# Patient Record
Sex: MALE | Race: WHITE | NOT HISPANIC OR LATINO | URBAN - METROPOLITAN AREA
[De-identification: names, ages, dates, MRNs, and addresses within clinical notes are randomized per-mention and may not be internally consistent; named-entity substitution may affect disease eponyms.]

---

## 2017-11-06 ENCOUNTER — EMERGENCY (EMERGENCY)
Facility: HOSPITAL | Age: 52
LOS: 1 days | Discharge: HOME | End: 2017-11-06
Attending: EMERGENCY MEDICINE | Admitting: EMERGENCY MEDICINE
Payer: COMMERCIAL

## 2017-11-06 VITALS
TEMPERATURE: 99 F | DIASTOLIC BLOOD PRESSURE: 83 MMHG | HEART RATE: 85 BPM | OXYGEN SATURATION: 98 % | RESPIRATION RATE: 16 BRPM | WEIGHT: 169.98 LBS | SYSTOLIC BLOOD PRESSURE: 137 MMHG

## 2017-11-06 VITALS
RESPIRATION RATE: 16 BRPM | DIASTOLIC BLOOD PRESSURE: 88 MMHG | HEART RATE: 75 BPM | OXYGEN SATURATION: 100 % | SYSTOLIC BLOOD PRESSURE: 128 MMHG | TEMPERATURE: 99 F

## 2017-11-06 DIAGNOSIS — Z88.0 ALLERGY STATUS TO PENICILLIN: ICD-10-CM

## 2017-11-06 DIAGNOSIS — R50.9 FEVER, UNSPECIFIED: ICD-10-CM

## 2017-11-06 DIAGNOSIS — Z96.619 PRESENCE OF UNSPECIFIED ARTIFICIAL SHOULDER JOINT: Chronic | ICD-10-CM

## 2017-11-06 DIAGNOSIS — R10.9 UNSPECIFIED ABDOMINAL PAIN: ICD-10-CM

## 2017-11-06 DIAGNOSIS — Z79.899 OTHER LONG TERM (CURRENT) DRUG THERAPY: ICD-10-CM

## 2017-11-06 DIAGNOSIS — M53.3 SACROCOCCYGEAL DISORDERS, NOT ELSEWHERE CLASSIFIED: ICD-10-CM

## 2017-11-06 LAB
ALBUMIN SERPL ELPH-MCNC: 4.2 G/DL — SIGNIFICANT CHANGE UP (ref 3.3–5)
ALP SERPL-CCNC: 71 U/L — SIGNIFICANT CHANGE UP (ref 40–120)
ALT FLD-CCNC: 40 U/L — SIGNIFICANT CHANGE UP (ref 10–45)
ANION GAP SERPL CALC-SCNC: 15 MMOL/L — SIGNIFICANT CHANGE UP (ref 5–17)
APPEARANCE UR: CLEAR — SIGNIFICANT CHANGE UP
AST SERPL-CCNC: 28 U/L — SIGNIFICANT CHANGE UP (ref 10–40)
BASOPHILS NFR BLD AUTO: 0.4 % — SIGNIFICANT CHANGE UP (ref 0–2)
BILIRUB SERPL-MCNC: 0.3 MG/DL — SIGNIFICANT CHANGE UP (ref 0.2–1.2)
BILIRUB UR-MCNC: NEGATIVE — SIGNIFICANT CHANGE UP
BUN SERPL-MCNC: 22 MG/DL — SIGNIFICANT CHANGE UP (ref 7–23)
CALCIUM SERPL-MCNC: 9.1 MG/DL — SIGNIFICANT CHANGE UP (ref 8.4–10.5)
CHLORIDE SERPL-SCNC: 99 MMOL/L — SIGNIFICANT CHANGE UP (ref 96–108)
CO2 SERPL-SCNC: 22 MMOL/L — SIGNIFICANT CHANGE UP (ref 22–31)
COLOR SPEC: YELLOW — SIGNIFICANT CHANGE UP
CREAT SERPL-MCNC: 1.25 MG/DL — SIGNIFICANT CHANGE UP (ref 0.5–1.3)
DIFF PNL FLD: NEGATIVE — SIGNIFICANT CHANGE UP
EOSINOPHIL NFR BLD AUTO: 5.7 % — SIGNIFICANT CHANGE UP (ref 0–6)
GLUCOSE SERPL-MCNC: 99 MG/DL — SIGNIFICANT CHANGE UP (ref 70–99)
GLUCOSE UR QL: NEGATIVE — SIGNIFICANT CHANGE UP
HCT VFR BLD CALC: 43.8 % — SIGNIFICANT CHANGE UP (ref 39–50)
HGB BLD-MCNC: 14.7 G/DL — SIGNIFICANT CHANGE UP (ref 13–17)
KETONES UR-MCNC: NEGATIVE — SIGNIFICANT CHANGE UP
LACTATE SERPL-SCNC: 0.9 MMOL/L — SIGNIFICANT CHANGE UP (ref 0.5–2)
LEUKOCYTE ESTERASE UR-ACNC: NEGATIVE — SIGNIFICANT CHANGE UP
LYMPHOCYTES # BLD AUTO: 18.9 % — SIGNIFICANT CHANGE UP (ref 13–44)
MCHC RBC-ENTMCNC: 31.3 PG — SIGNIFICANT CHANGE UP (ref 27–34)
MCHC RBC-ENTMCNC: 33.6 G/DL — SIGNIFICANT CHANGE UP (ref 32–36)
MCV RBC AUTO: 93.2 FL — SIGNIFICANT CHANGE UP (ref 80–100)
MONOCYTES NFR BLD AUTO: 10.8 % — SIGNIFICANT CHANGE UP (ref 2–14)
NEUTROPHILS NFR BLD AUTO: 64.2 % — SIGNIFICANT CHANGE UP (ref 43–77)
NITRITE UR-MCNC: NEGATIVE — SIGNIFICANT CHANGE UP
PH UR: 5.5 — SIGNIFICANT CHANGE UP (ref 5–8)
PLATELET # BLD AUTO: 226 K/UL — SIGNIFICANT CHANGE UP (ref 150–400)
POTASSIUM SERPL-MCNC: 4.4 MMOL/L — SIGNIFICANT CHANGE UP (ref 3.5–5.3)
POTASSIUM SERPL-SCNC: 4.4 MMOL/L — SIGNIFICANT CHANGE UP (ref 3.5–5.3)
PROT SERPL-MCNC: 7 G/DL — SIGNIFICANT CHANGE UP (ref 6–8.3)
PROT UR-MCNC: NEGATIVE MG/DL — SIGNIFICANT CHANGE UP
RBC # BLD: 4.7 M/UL — SIGNIFICANT CHANGE UP (ref 4.2–5.8)
RBC # FLD: 12.5 % — SIGNIFICANT CHANGE UP (ref 10.3–16.9)
SODIUM SERPL-SCNC: 136 MMOL/L — SIGNIFICANT CHANGE UP (ref 135–145)
SP GR SPEC: 1.01 — SIGNIFICANT CHANGE UP (ref 1–1.03)
UROBILINOGEN FLD QL: 0.2 E.U./DL — SIGNIFICANT CHANGE UP
WBC # BLD: 12.6 K/UL — HIGH (ref 3.8–10.5)
WBC # FLD AUTO: 12.6 K/UL — HIGH (ref 3.8–10.5)

## 2017-11-06 PROCEDURE — 99284 EMERGENCY DEPT VISIT MOD MDM: CPT | Mod: 25

## 2017-11-06 PROCEDURE — 74177 CT ABD & PELVIS W/CONTRAST: CPT | Mod: 26

## 2017-11-06 PROCEDURE — 87040 BLOOD CULTURE FOR BACTERIA: CPT

## 2017-11-06 PROCEDURE — 85025 COMPLETE CBC W/AUTO DIFF WBC: CPT

## 2017-11-06 PROCEDURE — 83605 ASSAY OF LACTIC ACID: CPT

## 2017-11-06 PROCEDURE — 99285 EMERGENCY DEPT VISIT HI MDM: CPT

## 2017-11-06 PROCEDURE — 36415 COLL VENOUS BLD VENIPUNCTURE: CPT

## 2017-11-06 PROCEDURE — 74177 CT ABD & PELVIS W/CONTRAST: CPT

## 2017-11-06 PROCEDURE — 87086 URINE CULTURE/COLONY COUNT: CPT

## 2017-11-06 PROCEDURE — 80053 COMPREHEN METABOLIC PANEL: CPT

## 2017-11-06 PROCEDURE — 81003 URINALYSIS AUTO W/O SCOPE: CPT

## 2017-11-06 RX ORDER — SODIUM CHLORIDE 9 MG/ML
1000 INJECTION INTRAMUSCULAR; INTRAVENOUS; SUBCUTANEOUS ONCE
Qty: 0 | Refills: 0 | Status: COMPLETED | OUTPATIENT
Start: 2017-11-06 | End: 2017-11-06

## 2017-11-06 RX ORDER — IOHEXOL 300 MG/ML
50 INJECTION, SOLUTION INTRAVENOUS ONCE
Qty: 0 | Refills: 0 | Status: COMPLETED | OUTPATIENT
Start: 2017-11-06 | End: 2017-11-06

## 2017-11-06 RX ORDER — SODIUM CHLORIDE 9 MG/ML
3 INJECTION INTRAMUSCULAR; INTRAVENOUS; SUBCUTANEOUS ONCE
Qty: 0 | Refills: 0 | Status: COMPLETED | OUTPATIENT
Start: 2017-11-06 | End: 2017-11-06

## 2017-11-06 RX ORDER — ACETAMINOPHEN 500 MG
975 TABLET ORAL ONCE
Qty: 0 | Refills: 0 | Status: COMPLETED | OUTPATIENT
Start: 2017-11-06 | End: 2017-11-06

## 2017-11-06 RX ADMIN — SODIUM CHLORIDE 1000 MILLILITER(S): 9 INJECTION INTRAMUSCULAR; INTRAVENOUS; SUBCUTANEOUS at 21:15

## 2017-11-06 RX ADMIN — IOHEXOL 50 MILLILITER(S): 300 INJECTION, SOLUTION INTRAVENOUS at 21:16

## 2017-11-06 RX ADMIN — Medication 975 MILLIGRAM(S): at 21:24

## 2017-11-06 RX ADMIN — SODIUM CHLORIDE 3 MILLILITER(S): 9 INJECTION INTRAMUSCULAR; INTRAVENOUS; SUBCUTANEOUS at 21:16

## 2017-11-06 NOTE — ED PROVIDER NOTE - OBJECTIVE STATEMENT
51 yo m w/ HIV on HAART c/o abd pain on/off past week, had sharp pain that was so intense he called 911, the pain was never as bad since then, had CT showing possible ileitis in the RLQ, started on cipro/flagyl - has been taking them and initialyl felt much better , all sx resolved and now the sx are coming back again, mainly lower abd. no n/v. + low grade fever today, loose stools, but not diarrhea. pain at the coccyx making it uncomfortable to sit. no rash, no bleeding, no dc. no URI sx.

## 2017-11-06 NOTE — ED PROVIDER NOTE - MEDICAL DECISION MAKING DETAILS
pt w/ abd pain 1 wk ago, had CT abd showing ileitis, taking cipro/flagyl, c/o feeling better and now feeling worse, low grade fever today, pain at the coccyx 9 had before, resolved, no back) unimpressive abd exam, noraml ct and labs ( had WBC of 18 before, tranding in the right direction) will d cwith results and f/u with his PMD. has appt next week

## 2017-11-06 NOTE — ED ADULT TRIAGE NOTE - CHIEF COMPLAINT QUOTE
lower abdominal cramping x 1 week.  Seen at Urgent Care on 10/31/17 and dx w/ Ileitis.  Patient long term through abx tx of Flagyl and Cipro.

## 2017-11-06 NOTE — ED ADULT NURSE NOTE - CHIEF COMPLAINT QUOTE
lower abdominal cramping x 1 week.  Seen at Urgent Care on 10/31/17 and dx w/ Ileitis.  Patient FCI through abx tx of Flagyl and Cipro.

## 2017-11-06 NOTE — ED PROVIDER NOTE - GASTROINTESTINAL, MLM
Abdomen soft, non-tender, no guarding. rectal: pain/tender at the coccyx bone, othewise normal - no skin changes or other findings

## 2017-11-08 LAB
CULTURE RESULTS: NO GROWTH — SIGNIFICANT CHANGE UP
SPECIMEN SOURCE: SIGNIFICANT CHANGE UP

## 2017-11-12 LAB
CULTURE RESULTS: SIGNIFICANT CHANGE UP
CULTURE RESULTS: SIGNIFICANT CHANGE UP
SPECIMEN SOURCE: SIGNIFICANT CHANGE UP
SPECIMEN SOURCE: SIGNIFICANT CHANGE UP

## 2018-05-26 ENCOUNTER — EMERGENCY (EMERGENCY)
Facility: HOSPITAL | Age: 53
LOS: 1 days | Discharge: HOME | End: 2018-05-26
Attending: EMERGENCY MEDICINE | Admitting: EMERGENCY MEDICINE
Payer: COMMERCIAL

## 2018-05-26 VITALS
OXYGEN SATURATION: 97 % | TEMPERATURE: 98 F | RESPIRATION RATE: 18 BRPM | SYSTOLIC BLOOD PRESSURE: 133 MMHG | DIASTOLIC BLOOD PRESSURE: 84 MMHG | HEART RATE: 82 BPM

## 2018-05-26 VITALS
DIASTOLIC BLOOD PRESSURE: 85 MMHG | SYSTOLIC BLOOD PRESSURE: 123 MMHG | HEIGHT: 70 IN | OXYGEN SATURATION: 95 % | HEART RATE: 94 BPM | RESPIRATION RATE: 18 BRPM | WEIGHT: 175.05 LBS | TEMPERATURE: 99 F

## 2018-05-26 DIAGNOSIS — Z79.899 OTHER LONG TERM (CURRENT) DRUG THERAPY: ICD-10-CM

## 2018-05-26 DIAGNOSIS — R50.9 FEVER, UNSPECIFIED: ICD-10-CM

## 2018-05-26 DIAGNOSIS — N41.0 ACUTE PROSTATITIS: ICD-10-CM

## 2018-05-26 DIAGNOSIS — Z96.619 PRESENCE OF UNSPECIFIED ARTIFICIAL SHOULDER JOINT: Chronic | ICD-10-CM

## 2018-05-26 DIAGNOSIS — Z88.0 ALLERGY STATUS TO PENICILLIN: ICD-10-CM

## 2018-05-26 DIAGNOSIS — Z79.2 LONG TERM (CURRENT) USE OF ANTIBIOTICS: ICD-10-CM

## 2018-05-26 DIAGNOSIS — B20 HUMAN IMMUNODEFICIENCY VIRUS [HIV] DISEASE: ICD-10-CM

## 2018-05-26 LAB
APPEARANCE UR: CLEAR — SIGNIFICANT CHANGE UP
BILIRUB UR-MCNC: NEGATIVE — SIGNIFICANT CHANGE UP
COLOR SPEC: YELLOW — SIGNIFICANT CHANGE UP
DIFF PNL FLD: NEGATIVE — SIGNIFICANT CHANGE UP
GLUCOSE UR QL: NEGATIVE — SIGNIFICANT CHANGE UP
KETONES UR-MCNC: NEGATIVE — SIGNIFICANT CHANGE UP
LEUKOCYTE ESTERASE UR-ACNC: NEGATIVE — SIGNIFICANT CHANGE UP
NITRITE UR-MCNC: NEGATIVE — SIGNIFICANT CHANGE UP
PH UR: 5.5 — SIGNIFICANT CHANGE UP (ref 5–8)
PROT UR-MCNC: NEGATIVE MG/DL — SIGNIFICANT CHANGE UP
RAPID RVP RESULT: SIGNIFICANT CHANGE UP
SP GR SPEC: 1.01 — SIGNIFICANT CHANGE UP (ref 1–1.03)
UROBILINOGEN FLD QL: 0.2 E.U./DL — SIGNIFICANT CHANGE UP

## 2018-05-26 PROCEDURE — 96374 THER/PROPH/DIAG INJ IV PUSH: CPT | Mod: XU

## 2018-05-26 PROCEDURE — 80053 COMPREHEN METABOLIC PANEL: CPT

## 2018-05-26 PROCEDURE — 71046 X-RAY EXAM CHEST 2 VIEWS: CPT | Mod: 26

## 2018-05-26 PROCEDURE — 96375 TX/PRO/DX INJ NEW DRUG ADDON: CPT | Mod: XU

## 2018-05-26 PROCEDURE — 85610 PROTHROMBIN TIME: CPT

## 2018-05-26 PROCEDURE — 87591 N.GONORRHOEAE DNA AMP PROB: CPT

## 2018-05-26 PROCEDURE — 87581 M.PNEUMON DNA AMP PROBE: CPT

## 2018-05-26 PROCEDURE — 87798 DETECT AGENT NOS DNA AMP: CPT

## 2018-05-26 PROCEDURE — 99284 EMERGENCY DEPT VISIT MOD MDM: CPT | Mod: 25

## 2018-05-26 PROCEDURE — 96372 THER/PROPH/DIAG INJ SC/IM: CPT | Mod: XU

## 2018-05-26 PROCEDURE — 87491 CHLMYD TRACH DNA AMP PROBE: CPT

## 2018-05-26 PROCEDURE — 93005 ELECTROCARDIOGRAM TRACING: CPT

## 2018-05-26 PROCEDURE — 81003 URINALYSIS AUTO W/O SCOPE: CPT

## 2018-05-26 PROCEDURE — 87040 BLOOD CULTURE FOR BACTERIA: CPT

## 2018-05-26 PROCEDURE — 71046 X-RAY EXAM CHEST 2 VIEWS: CPT

## 2018-05-26 PROCEDURE — 93010 ELECTROCARDIOGRAM REPORT: CPT

## 2018-05-26 PROCEDURE — 85730 THROMBOPLASTIN TIME PARTIAL: CPT

## 2018-05-26 PROCEDURE — 36415 COLL VENOUS BLD VENIPUNCTURE: CPT

## 2018-05-26 PROCEDURE — 74177 CT ABD & PELVIS W/CONTRAST: CPT

## 2018-05-26 PROCEDURE — 85025 COMPLETE CBC W/AUTO DIFF WBC: CPT

## 2018-05-26 PROCEDURE — 87633 RESP VIRUS 12-25 TARGETS: CPT

## 2018-05-26 PROCEDURE — G0103: CPT

## 2018-05-26 PROCEDURE — 87486 CHLMYD PNEUM DNA AMP PROBE: CPT

## 2018-05-26 PROCEDURE — 83605 ASSAY OF LACTIC ACID: CPT

## 2018-05-26 PROCEDURE — 74177 CT ABD & PELVIS W/CONTRAST: CPT | Mod: 26

## 2018-05-26 RX ORDER — KETOROLAC TROMETHAMINE 30 MG/ML
30 SYRINGE (ML) INJECTION ONCE
Qty: 0 | Refills: 0 | Status: DISCONTINUED | OUTPATIENT
Start: 2018-05-26 | End: 2018-05-26

## 2018-05-26 RX ORDER — AZITHROMYCIN 500 MG/1
1000 TABLET, FILM COATED ORAL ONCE
Qty: 0 | Refills: 0 | Status: COMPLETED | OUTPATIENT
Start: 2018-05-26 | End: 2018-05-26

## 2018-05-26 RX ORDER — SODIUM CHLORIDE 9 MG/ML
1000 INJECTION INTRAMUSCULAR; INTRAVENOUS; SUBCUTANEOUS ONCE
Qty: 0 | Refills: 0 | Status: COMPLETED | OUTPATIENT
Start: 2018-05-26 | End: 2018-05-26

## 2018-05-26 RX ORDER — CEFTRIAXONE 500 MG/1
250 INJECTION, POWDER, FOR SOLUTION INTRAMUSCULAR; INTRAVENOUS ONCE
Qty: 0 | Refills: 0 | Status: DISCONTINUED | OUTPATIENT
Start: 2018-05-26 | End: 2018-05-26

## 2018-05-26 RX ORDER — CEFTRIAXONE 500 MG/1
250 INJECTION, POWDER, FOR SOLUTION INTRAMUSCULAR; INTRAVENOUS ONCE
Qty: 0 | Refills: 0 | Status: COMPLETED | OUTPATIENT
Start: 2018-05-26 | End: 2018-05-26

## 2018-05-26 RX ORDER — CIPROFLOXACIN LACTATE 400MG/40ML
1 VIAL (ML) INTRAVENOUS
Qty: 21 | Refills: 0
Start: 2018-05-26 | End: 2018-06-15

## 2018-05-26 RX ORDER — SUMATRIPTAN SUCCINATE 4 MG/.5ML
6 INJECTION, SOLUTION SUBCUTANEOUS ONCE
Qty: 0 | Refills: 0 | Status: COMPLETED | OUTPATIENT
Start: 2018-05-26 | End: 2018-05-26

## 2018-05-26 RX ORDER — METOCLOPRAMIDE HCL 10 MG
10 TABLET ORAL ONCE
Qty: 0 | Refills: 0 | Status: COMPLETED | OUTPATIENT
Start: 2018-05-26 | End: 2018-05-26

## 2018-05-26 RX ADMIN — Medication 104 MILLIGRAM(S): at 16:03

## 2018-05-26 RX ADMIN — SODIUM CHLORIDE 1000 MILLILITER(S): 9 INJECTION INTRAMUSCULAR; INTRAVENOUS; SUBCUTANEOUS at 14:38

## 2018-05-26 RX ADMIN — CEFTRIAXONE 250 MILLIGRAM(S): 500 INJECTION, POWDER, FOR SOLUTION INTRAMUSCULAR; INTRAVENOUS at 21:11

## 2018-05-26 RX ADMIN — Medication 30 MILLIGRAM(S): at 16:03

## 2018-05-26 RX ADMIN — SUMATRIPTAN SUCCINATE 6 MILLIGRAM(S): 4 INJECTION, SOLUTION SUBCUTANEOUS at 16:03

## 2018-05-26 RX ADMIN — AZITHROMYCIN 1000 MILLIGRAM(S): 500 TABLET, FILM COATED ORAL at 20:24

## 2018-05-26 NOTE — ED ADULT NURSE NOTE - OBJECTIVE STATEMENT
hx of HIV, c.o generalized body ache, "feeling out of it", non productive cough since Wednesday. denies any fever, but states he felt warm today. c.o neck stiffness, full range of motion noted. denies any photophobia. denies any chest pain, sob. ekg at bedside. pt states he has trouble "starting to urinate", which started "sometime this week". denies any burning, frequency or blood in urine.

## 2018-05-26 NOTE — ED ADULT TRIAGE NOTE - CHIEF COMPLAINT QUOTE
Patient c/o headache  and disorientation since Wednesday ,also with rashes on the fore head , fever , chills and neck stiffness started this morning . Was sent from urgent care for r/o viral menigitis and shingles . Patient c/o headache  and disorientation since Wednesday ,also with rashes on the fore head , fever , chills and neck stiffness started this morning . Was sent from urgent care for r/o viral meningitis and shingles .

## 2018-05-26 NOTE — ED PROVIDER NOTE - OBJECTIVE STATEMENT
fever  52 yo with 4 days of new symptoms, reports fever for first time today, several days of feeling unwell, achy, funny feeling on skin as though hair standing on head, had sensation of disconnect a few days ago and today starting having dull 2/10 headache and tingling sensation in skin and diaphoresis, reports same duration new difficulty with stop and go urination , no ho of  prostate problems, sexually active and reports neg HIV test 6 months ago, + anal receptive sex, also non productive cough after recent URI a couple week ago.

## 2018-05-26 NOTE — ED PROVIDER NOTE - CONSTITUTIONAL, MLM
normal... non toxic appearing,  well nourished, awake, alert, oriented to person, place, time/situation and appears minimally uncomfortable ,  diaphoretic on forehead, no meningeal signs

## 2018-05-26 NOTE — ED ADULT NURSE NOTE - CHIEF COMPLAINT QUOTE
Patient c/o headache  and disorientation since Wednesday ,also with rashes on the fore head , fever , chills and neck stiffness started this morning . Was sent from urgent care for r/o viral meningitis and shingles .

## 2018-05-26 NOTE — ED PROVIDER NOTE - MEDICAL DECISION MAKING DETAILS
Patient with multiple complaints, sent in by urgent care for concern for shingles meningitis, Patient with minimal headache and no meningeal signs , highly doubt this, considered shingles initially due to burning sensation at scalp however suspect more likely secondary to mild migraine as mostly resolved , no rash at this time.  subtle pna picked up on CT, also high suspician for prostatitis due to prostate tenderenss on exam with new stop and go urination, specific antibiotics discussed with Urology . discussed emergent return instructions

## 2018-05-28 LAB
C TRACH RRNA SPEC QL NAA+PROBE: SIGNIFICANT CHANGE UP
N GONORRHOEA RRNA SPEC QL NAA+PROBE: SIGNIFICANT CHANGE UP
SPECIMEN SOURCE: SIGNIFICANT CHANGE UP

## 2019-02-19 ENCOUNTER — APPOINTMENT (OUTPATIENT)
Dept: OTOLARYNGOLOGY | Facility: CLINIC | Age: 54
End: 2019-02-19
Payer: COMMERCIAL

## 2019-02-19 VITALS
SYSTOLIC BLOOD PRESSURE: 127 MMHG | BODY MASS INDEX: 25.05 KG/M2 | OXYGEN SATURATION: 95 % | DIASTOLIC BLOOD PRESSURE: 81 MMHG | HEART RATE: 85 BPM | WEIGHT: 175 LBS | TEMPERATURE: 98.4 F | HEIGHT: 70 IN

## 2019-02-19 DIAGNOSIS — Z81.8 FAMILY HISTORY OF OTHER MENTAL AND BEHAVIORAL DISORDERS: ICD-10-CM

## 2019-02-19 DIAGNOSIS — J04.0 ACUTE LARYNGITIS: ICD-10-CM

## 2019-02-19 DIAGNOSIS — Z78.9 OTHER SPECIFIED HEALTH STATUS: ICD-10-CM

## 2019-02-19 DIAGNOSIS — J34.9 UNSPECIFIED DISORDER OF NOSE AND NASAL SINUSES: ICD-10-CM

## 2019-02-19 DIAGNOSIS — K21.9 ACUTE LARYNGITIS: ICD-10-CM

## 2019-02-19 DIAGNOSIS — Z87.09 PERSONAL HISTORY OF OTHER DISEASES OF THE RESPIRATORY SYSTEM: ICD-10-CM

## 2019-02-19 DIAGNOSIS — Z82.49 FAMILY HISTORY OF ISCHEMIC HEART DISEASE AND OTHER DISEASES OF THE CIRCULATORY SYSTEM: ICD-10-CM

## 2019-02-19 PROCEDURE — 31575 DIAGNOSTIC LARYNGOSCOPY: CPT

## 2019-02-19 PROCEDURE — 99203 OFFICE O/P NEW LOW 30 MIN: CPT | Mod: 25

## 2019-02-19 RX ORDER — FLUTICASONE PROPIONATE 50 UG/1
50 SPRAY, METERED NASAL
Qty: 16 | Refills: 0 | Status: DISCONTINUED | COMMUNITY
Start: 2019-02-12

## 2019-02-19 RX ORDER — METHYLPREDNISOLONE 4 MG/1
4 TABLET ORAL
Qty: 21 | Refills: 0 | Status: DISCONTINUED | COMMUNITY
Start: 2019-02-12

## 2019-02-19 RX ORDER — ROSUVASTATIN CALCIUM 5 MG/1
5 TABLET, FILM COATED ORAL
Qty: 30 | Refills: 0 | Status: ACTIVE | COMMUNITY
Start: 2017-12-27

## 2019-02-19 RX ORDER — ZOLPIDEM TARTRATE 10 MG/1
10 TABLET ORAL
Qty: 30 | Refills: 0 | Status: DISCONTINUED | COMMUNITY
Start: 2018-08-23

## 2019-02-19 RX ORDER — ONDANSETRON 4 MG/1
4 TABLET, ORALLY DISINTEGRATING ORAL
Qty: 20 | Refills: 0 | Status: DISCONTINUED | COMMUNITY
Start: 2019-01-23

## 2019-02-19 RX ORDER — CEFDINIR 300 MG/1
300 CAPSULE ORAL
Qty: 20 | Refills: 0 | Status: DISCONTINUED | COMMUNITY
Start: 2019-01-08

## 2019-02-19 NOTE — REVIEW OF SYSTEMS
[Patient Intake Form Reviewed] : Patient intake form was reviewed [Nasal Congestion] : nasal congestion [Nose Bleeds] : nose bleeds [Sinus Pain] : sinus pain [Sinus Pressure] : sinus pressure [As Noted in HPI] : as noted in HPI [Hoarseness] : hoarseness [Throat Pain] : throat pain [Throat Dryness] : throat dryness [Negative] : Heme/Lymph

## 2019-02-22 NOTE — PHYSICAL EXAM
[Normal] : tympanic membranes are normal in both ears [de-identified] : deviated nasal septum.  Reflux laryngitis.  Purulent post nasal drip

## 2019-02-22 NOTE — CONSULT LETTER
[Dear  ___] : Dear  [unfilled], [Consult Letter:] : I had the pleasure of evaluating your patient, [unfilled]. [Please see my note below.] : Please see my note below. [Consult Closing:] : Thank you very much for allowing me to participate in the care of this patient.  If you have any questions, please do not hesitate to contact me. [Sincerely,] : Sincerely, [FreeTextEntry3] : Dhaval Saleh MD, FACS\par Professor of Otolaryngology, [facility] [employer] at [facility]\par Director, [facility], [facility]\par , Head & Neck Service Line, [facility]\par

## 2019-02-22 NOTE — HISTORY OF PRESENT ILLNESS
[de-identified] : 53 years old male patient with history of sinus congestion for the past couple of months.   Patient is present today in the office with deviated nasal septum.  Reflux laryngitis.  Purulent post nasal drip

## 2019-02-22 NOTE — PROCEDURE
[Congested] : congested [Deviated to the Lt] : deviated to the left [Image(s) Captured] : image(s) captured and filed [Topical Lidocaine] : topical lidocaine [Flexible Endoscope] : examined with the flexible endoscope [Serial Number: ___] : Serial Number: [unfilled] [Glottis Arytenoid Cartilages Erythema] : bilateral arytenoid ~M erythema [de-identified] : Deviated nasal septum.  Reflux laryngitis.  Purulent post nasal drip

## 2019-02-25 LAB — BACTERIA FLD CULT: ABNORMAL

## 2019-04-02 ENCOUNTER — APPOINTMENT (OUTPATIENT)
Dept: OTOLARYNGOLOGY | Facility: CLINIC | Age: 54
End: 2019-04-02
Payer: COMMERCIAL

## 2019-04-02 VITALS
RESPIRATION RATE: 17 BRPM | HEART RATE: 74 BPM | DIASTOLIC BLOOD PRESSURE: 81 MMHG | OXYGEN SATURATION: 96 % | TEMPERATURE: 98.6 F | SYSTOLIC BLOOD PRESSURE: 131 MMHG

## 2019-04-02 DIAGNOSIS — J30.9 ALLERGIC RHINITIS, UNSPECIFIED: ICD-10-CM

## 2019-04-02 PROCEDURE — 99213 OFFICE O/P EST LOW 20 MIN: CPT

## 2019-04-02 RX ORDER — FLUTICASONE FUROATE 27.5 UG/1
27.5 SPRAY, METERED NASAL DAILY
Qty: 2 | Refills: 6 | Status: ACTIVE | COMMUNITY
Start: 2019-04-02 | End: 1900-01-01

## 2019-04-02 NOTE — HISTORY OF PRESENT ILLNESS
[de-identified] : 53 years old male patient with history of deviated nasal septum.  Reflux laryngitis.  Post nasal drip    Patient is present today in the office with improve  Reflux laryngitis.  Allergic Rhinitis.   Post nasal drip.  Deviated Nasal Septum

## 2019-04-02 NOTE — CONSULT LETTER
[FreeTextEntry3] : Dhaval Saleh MD, FACS\par Professor of Otolaryngology, [facility] [employer] at [facility]\par Director, [facility], [facility]\par , Head [facility] Neck Service Line, [facility]\par

## 2019-04-02 NOTE — PROCEDURE
[Congested] : congested [Deviated to the Lt] : deviated to the left [Image(s) Captured] : image(s) captured and filed [Topical Lidocaine] : topical lidocaine [Flexible Endoscope] : examined with the flexible endoscope [Serial Number: ___] : Serial Number: [unfilled] [Glottis Arytenoid Cartilages Erythema] : bilateral arytenoid ~M erythema [de-identified] :  Improve  Reflux laryngitis.  Allergic Rhinitis.   Post nasal drip.  Deviated Nasal Septum

## 2019-04-02 NOTE — REASON FOR VISIT
[Subsequent Evaluation] : a subsequent evaluation for [FreeTextEntry2] : deviated nasal septum.  Reflux laryngitis.  Post nasal drip

## 2019-04-02 NOTE — PHYSICAL EXAM
[Normal] : no rashes [de-identified] : deviated nasal septum.  Reflux laryngitis.  Purulent post nasal drip

## 2019-04-03 RX ORDER — SULFAMETHOXAZOLE AND TRIMETHOPRIM 800; 160 MG/1; MG/1
800-160 TABLET ORAL TWICE DAILY
Qty: 14 | Refills: 0 | Status: COMPLETED | COMMUNITY
Start: 2019-02-19 | End: 2019-04-03

## 2019-05-30 ENCOUNTER — APPOINTMENT (OUTPATIENT)
Dept: OTOLARYNGOLOGY | Facility: CLINIC | Age: 54
End: 2019-05-30
Payer: COMMERCIAL

## 2019-05-30 VITALS
TEMPERATURE: 98.1 F | SYSTOLIC BLOOD PRESSURE: 155 MMHG | DIASTOLIC BLOOD PRESSURE: 99 MMHG | HEART RATE: 87 BPM | OXYGEN SATURATION: 97 %

## 2019-05-30 PROCEDURE — 99213 OFFICE O/P EST LOW 20 MIN: CPT | Mod: 25

## 2019-05-30 PROCEDURE — 31575 DIAGNOSTIC LARYNGOSCOPY: CPT

## 2019-05-30 NOTE — REASON FOR VISIT
[Subsequent Evaluation] : a subsequent evaluation for [FreeTextEntry2] : GERD improved w diet and omeprazole

## 2019-09-09 RX ORDER — OMEPRAZOLE 40 MG/1
40 CAPSULE, DELAYED RELEASE ORAL
Qty: 1 | Refills: 8 | Status: ACTIVE | COMMUNITY
Start: 2019-02-19 | End: 1900-01-01

## 2019-10-29 ENCOUNTER — INPATIENT (INPATIENT)
Facility: HOSPITAL | Age: 54
LOS: 4 days | Discharge: HOME | DRG: 558 | End: 2019-11-03
Attending: INTERNAL MEDICINE | Admitting: INTERNAL MEDICINE
Payer: COMMERCIAL

## 2019-10-29 VITALS
DIASTOLIC BLOOD PRESSURE: 90 MMHG | HEIGHT: 70 IN | HEART RATE: 98 BPM | OXYGEN SATURATION: 97 % | RESPIRATION RATE: 17 BRPM | TEMPERATURE: 99 F | SYSTOLIC BLOOD PRESSURE: 153 MMHG | WEIGHT: 179.9 LBS

## 2019-10-29 DIAGNOSIS — Z96.619 PRESENCE OF UNSPECIFIED ARTIFICIAL SHOULDER JOINT: Chronic | ICD-10-CM

## 2019-10-29 LAB
ANION GAP SERPL CALC-SCNC: 10 MMOL/L — SIGNIFICANT CHANGE UP (ref 5–17)
APTT BLD: 29.6 SEC — SIGNIFICANT CHANGE UP (ref 27.5–36.3)
BASOPHILS # BLD AUTO: 0.04 K/UL — SIGNIFICANT CHANGE UP (ref 0–0.2)
BASOPHILS NFR BLD AUTO: 0.3 % — SIGNIFICANT CHANGE UP (ref 0–2)
BUN SERPL-MCNC: 23 MG/DL — SIGNIFICANT CHANGE UP (ref 7–23)
CALCIUM SERPL-MCNC: 9 MG/DL — SIGNIFICANT CHANGE UP (ref 8.4–10.5)
CHLORIDE SERPL-SCNC: 102 MMOL/L — SIGNIFICANT CHANGE UP (ref 96–108)
CO2 SERPL-SCNC: 26 MMOL/L — SIGNIFICANT CHANGE UP (ref 22–31)
CREAT SERPL-MCNC: 1.06 MG/DL — SIGNIFICANT CHANGE UP (ref 0.5–1.3)
EOSINOPHIL # BLD AUTO: 0.06 K/UL — SIGNIFICANT CHANGE UP (ref 0–0.5)
EOSINOPHIL NFR BLD AUTO: 0.5 % — SIGNIFICANT CHANGE UP (ref 0–6)
GLUCOSE SERPL-MCNC: 99 MG/DL — SIGNIFICANT CHANGE UP (ref 70–99)
HCT VFR BLD CALC: 42.4 % — SIGNIFICANT CHANGE UP (ref 39–50)
HGB BLD-MCNC: 14 G/DL — SIGNIFICANT CHANGE UP (ref 13–17)
IMM GRANULOCYTES NFR BLD AUTO: 0.4 % — SIGNIFICANT CHANGE UP (ref 0–1.5)
INR BLD: 1.16 — SIGNIFICANT CHANGE UP (ref 0.88–1.16)
LYMPHOCYTES # BLD AUTO: 1.7 K/UL — SIGNIFICANT CHANGE UP (ref 1–3.3)
LYMPHOCYTES # BLD AUTO: 14.6 % — SIGNIFICANT CHANGE UP (ref 13–44)
MCHC RBC-ENTMCNC: 30.5 PG — SIGNIFICANT CHANGE UP (ref 27–34)
MCHC RBC-ENTMCNC: 33 GM/DL — SIGNIFICANT CHANGE UP (ref 32–36)
MCV RBC AUTO: 92.4 FL — SIGNIFICANT CHANGE UP (ref 80–100)
MONOCYTES # BLD AUTO: 1 K/UL — HIGH (ref 0–0.9)
MONOCYTES NFR BLD AUTO: 8.6 % — SIGNIFICANT CHANGE UP (ref 2–14)
NEUTROPHILS # BLD AUTO: 8.78 K/UL — HIGH (ref 1.8–7.4)
NEUTROPHILS NFR BLD AUTO: 75.6 % — SIGNIFICANT CHANGE UP (ref 43–77)
NRBC # BLD: 0 /100 WBCS — SIGNIFICANT CHANGE UP (ref 0–0)
PLATELET # BLD AUTO: 272 K/UL — SIGNIFICANT CHANGE UP (ref 150–400)
POTASSIUM SERPL-MCNC: 4.6 MMOL/L — SIGNIFICANT CHANGE UP (ref 3.5–5.3)
POTASSIUM SERPL-SCNC: 4.6 MMOL/L — SIGNIFICANT CHANGE UP (ref 3.5–5.3)
PROTHROM AB SERPL-ACNC: 13.2 SEC — HIGH (ref 10–12.9)
RBC # BLD: 4.59 M/UL — SIGNIFICANT CHANGE UP (ref 4.2–5.8)
RBC # FLD: 13.4 % — SIGNIFICANT CHANGE UP (ref 10.3–14.5)
SODIUM SERPL-SCNC: 138 MMOL/L — SIGNIFICANT CHANGE UP (ref 135–145)
WBC # BLD: 11.63 K/UL — HIGH (ref 3.8–10.5)
WBC # FLD AUTO: 11.63 K/UL — HIGH (ref 3.8–10.5)

## 2019-10-29 PROCEDURE — 99285 EMERGENCY DEPT VISIT HI MDM: CPT

## 2019-10-29 PROCEDURE — 73562 X-RAY EXAM OF KNEE 3: CPT | Mod: 26,LT

## 2019-10-29 RX ORDER — VANCOMYCIN HCL 1 G
1250 VIAL (EA) INTRAVENOUS EVERY 12 HOURS
Refills: 0 | Status: DISCONTINUED | OUTPATIENT
Start: 2019-10-30 | End: 2019-10-30

## 2019-10-29 RX ORDER — TRAMADOL HYDROCHLORIDE 50 MG/1
50 TABLET ORAL ONCE
Refills: 0 | Status: DISCONTINUED | OUTPATIENT
Start: 2019-10-29 | End: 2019-10-29

## 2019-10-29 RX ORDER — BICTEGRAVIR SODIUM, EMTRICITABINE, AND TENOFOVIR ALAFENAMIDE FUMARATE 30; 120; 15 MG/1; MG/1; MG/1
1 TABLET ORAL EVERY 24 HOURS
Refills: 0 | Status: DISCONTINUED | OUTPATIENT
Start: 2019-10-30 | End: 2019-11-03

## 2019-10-29 RX ORDER — ACETAMINOPHEN 500 MG
650 TABLET ORAL EVERY 6 HOURS
Refills: 0 | Status: DISCONTINUED | OUTPATIENT
Start: 2019-10-29 | End: 2019-11-03

## 2019-10-29 RX ORDER — VANCOMYCIN HCL 1 G
1000 VIAL (EA) INTRAVENOUS ONCE
Refills: 0 | Status: COMPLETED | OUTPATIENT
Start: 2019-10-29 | End: 2019-10-29

## 2019-10-29 RX ADMIN — TRAMADOL HYDROCHLORIDE 50 MILLIGRAM(S): 50 TABLET ORAL at 22:17

## 2019-10-29 RX ADMIN — TRAMADOL HYDROCHLORIDE 50 MILLIGRAM(S): 50 TABLET ORAL at 17:17

## 2019-10-29 RX ADMIN — Medication 250 MILLIGRAM(S): at 19:41

## 2019-10-29 RX ADMIN — TRAMADOL HYDROCHLORIDE 50 MILLIGRAM(S): 50 TABLET ORAL at 23:28

## 2019-10-29 RX ADMIN — TRAMADOL HYDROCHLORIDE 50 MILLIGRAM(S): 50 TABLET ORAL at 18:00

## 2019-10-29 NOTE — ED PROVIDER NOTE - CLINICAL SUMMARY MEDICAL DECISION MAKING FREE TEXT BOX
Pt p/w cellulitis w/ concomitant possible septic arthritis vs abscess. FROM, unlikely septic joint. Check labs, XR, ortho consult. PT will likely need admission for IV abx given failed outpt tx

## 2019-10-29 NOTE — H&P ADULT - HISTORY OF PRESENT ILLNESS
54M PMHx HIV (CD4 900s, VL undetectable, on Biktarvy, no hx opportunistic infections), HLD, HepB (inactive), presented with leg redness swelling that started last Friday. 54M PMHx HIV (CD4 900s, VL undetectable, on Biktarvy, no hx opportunistic infections), HLD, HepB (inactive), presented with leg redness swelling that started last Friday. Patient could not recall any trauma or exposures to RLE, but noted increased redness and swelling over the course of the first 3 days. This prompted the patient to be evaluated at urgent care, where he was started on Bactrim DS BID. Symptoms worsened, and patient returned to urgent care on Monday and was started on clindamycin TID. Patient notably had blood work showing WBC 16 (now downtrended to 11). Otherwise, patient main complaint has been the LLE pain, which as been constant, radiating up to mid thigh and lower ankle, 8/10 intensity, no known alleviating/exacerbating factors. Patient states 1 day history of night sweats, denies fever, chills, nausea, vomiting, diarrhea. Has no difficulty on ambulation.    ED vitals: T 99, HR 98, /90, RR 17, SpO2 97%  ED labs: WBC 11.63, no neutrophilic predominance, ESR 67, PT 13.2, CRP 15.08  ED studies: none  ED course: vancomycin 1g IV, tramadol 50 mg x1. admit to [facility] for LLE cellulitis

## 2019-10-29 NOTE — H&P ADULT - NSHPREVIEWOFSYSTEMS_GEN_ALL_CORE
REVIEW OF SYSTEMS:    CONSTITUTIONAL: +night sweats, Patient denies weakness, fevers or chills  EYES/ENT: Patient denies visual changes;  denies vertigo or throat pain   NECK: Patient denies pain or stiffness  RESPIRATORY: Patient denies cough, wheezing, hemoptysis; denies shortness of breath  CARDIOVASCULAR: Patient denies chest pain or palpitations  GASTROINTESTINAL: Patient denies abdominal or epigastric pain, nausea, vomiting, or hematemesis, diarrhea or constipation, melena or hematochezia.  GENITOURINARY: Patient denies dysuria, frequency or hematuria  MUSCULOSKELETAL: LLE pain  NEUROLOGICAL: Patient denies numbness or weakness  SKIN: Patient denies itching, burning, rashes, or lesions   All other review of systems is negative unless indicated above.

## 2019-10-29 NOTE — H&P ADULT - PROBLEM SELECTOR PLAN 4
History of HLD, vs increased ASCVD risk  - C/w atorvastatin 20 mg QHS (crestor therapeutic exchange)

## 2019-10-29 NOTE — H&P ADULT - NSHPSOCIALHISTORY_GEN_ALL_CORE
Patient works in [employer]. Lives alone. Contracted HIV from unprotected sex many years ago. Denies illicit drug use, denies smoking cigarettes, denies drinking alcohol.

## 2019-10-29 NOTE — ED ADULT NURSE NOTE - OBJECTIVE STATEMENT
54 y.o M a&ox4 walked in from front triage c.o lower leg swelling. went to [facility] MD sat, and Sunday, on bactrim x 3 days. reports worsening L lower leg swelling, tenderness. + 2 non-pitting edema to L leg. + hot and tender to touch. denies fevers, chills. denies recent trauma to site. able to ambualte with steady gait. no numbness, tingling. cap refill < 2 s bilaterally. no PMH.

## 2019-10-29 NOTE — ED PROVIDER NOTE - SECONDARY DIAGNOSIS.
Septic arthritis, due to unspecified organism, septic arthritis of unspecified location Septic bursitis

## 2019-10-29 NOTE — ED PROVIDER NOTE - CARE PLAN
Principal Discharge DX:	Cellulitis, unspecified cellulitis site Principal Discharge DX:	Cellulitis, unspecified cellulitis site  Secondary Diagnosis:	Septic arthritis, due to unspecified organism, septic arthritis of unspecified location Principal Discharge DX:	Cellulitis, unspecified cellulitis site  Secondary Diagnosis:	Septic bursitis

## 2019-10-29 NOTE — CONSULT NOTE ADULT - SUBJECTIVE AND OBJECTIVE BOX
Pt Name: TAYA WHALEN  MRN: [id number]    The patient was seen and examined. 54M with prepatellar septic bursitis of L knee. Patient noticed swelling, redness of L knee on Saturday, presented to  where he was given Bactrim prescription. Worsened on Sunday and patient returned where he was also prescribed Clindamycin. Returns to ED today due to worsening of swelling and redness in LLE. Has been abvle to bear full weight on LLE. Denies any numbness/tingling/fevers/chills. Works at a [employer] office. WBC in  was 16, today it is downtrending to 11.    ROS is otherwise negative.  PAST MEDICAL & SURGICAL HISTORY:  HIV (human immunodeficiency virus infection)  Shoulder joint replacement status      Allergies: NKDA    Medications:  vancomycin  IVPB 1000 milliGRAM(s) IV Intermittent once      Social History:  Ambulation: Walking independently    PHYSICAL EXAM:    T(C): 37.2 (10-29-19 @ 16:43), Max: 37.2 (10-29-19 @ 16:43)  HR: 98 (10-29-19 @ 16:43) (98 - 98)  BP: 153/90 (10-29-19 @ 16:43) (153/90 - 153/90)  RR: 17 (10-29-19 @ 16:43) (17 - 17)  SpO2: 97% (10-29-19 @ 16:43) (97% - 97%)  Wt(kg): --    Gen: well developed, well nourished, comfortable  Affected extremity: LLE  cellultiis extending from knee down to ankle joint diffusely  prepatellar bursa inflamed and tender. effusion noted  full ROM of knee       Motor: 5/5 GS/TA/EHL      Sensation: SILT sp/dp/mayen/saph/t      wwp <2 sec cap refill     Labs:                        14.0   11.63 )-----------( 272      ( 29 Oct 2019 17:31 )             42.4     10-29    138  |  102  |  23  ----------------------------<  99  4.6   |  26  |  1.06    Ca    9.0      29 Oct 2019 17:31    XR shows soft tissue swelling in prepatellar tissue.    A/P  Pt is a 55yo Male s/p L knee prepatellar bursitis  -Compressive ACE wrap on LLE  -WBAT LLE  -ABX per ED, can continue current regimen  -trend inflammatory markers  -f/u with Dr. Sadler in office in 10-14 days  d/w attending on call Dr. Sadler

## 2019-10-29 NOTE — H&P ADULT - PROBLEM SELECTOR PLAN 2
Orthopedic surgery consulted for worsening pain and swelling, and history of MRSA cellulitis in a patient presenting with cellulitis again - concern for septic bursitis/arthritis  - F/u orthopedic surgery recs, no tap for now  - C/w plan as per problem #1  - F/u ESR/CRP

## 2019-10-29 NOTE — H&P ADULT - ASSESSMENT
54M PMHx HIV (CD4 900s, VL undetectable, on Biktarvy, no hx opportunistic infections), HLD, HepB (inactive), presents with LLE swelling, erythema, pain, worsening over 5 days, s/p bactrim and clindamycin tx; admit for cellulitis

## 2019-10-29 NOTE — H&P ADULT - PROBLEM SELECTOR PLAN 1
presents with LLE swelling, erythema, pain, worsening over 5 days, s/p bactrim and clindamycin tx; downtrending WBC from 16 (outpatient) to 11 (intake). S/p vancomycin 1g in the ED. Given history of MRSA cellulitis (2 years ago, LLE just lateral proximal thigh), and HIV status, concern for MRSA cellulitis again  - C/w vancomycin 1250 mg q12h (10/29 - ) obtain trough prior to 4th dose  - F/u blood cultures  - Tylenol for pain control

## 2019-10-29 NOTE — H&P ADULT - NSHPLABSRESULTS_GEN_ALL_CORE
LABS:                         14.0   11.63 )-----------( 272      ( 29 Oct 2019 17:31 )             42.4     10-29    138  |  102  |  23  ----------------------------<  99  4.6   |  26  |  1.06    Ca    9.0      29 Oct 2019 17:31      PT/INR - ( 29 Oct 2019 17:31 )   PT: 13.2 sec;   INR: 1.16          PTT - ( 29 Oct 2019 17:31 )  PTT:29.6 sec      RADIOLOGY, EKG & ADDITIONAL TESTS:  No radiologic studies LABS:                         14.0   11.63 )-----------( 272      ( 29 Oct 2019 17:31 )             42.4     10-29    138  |  102  |  23  ----------------------------<  99  4.6   |  26  |  1.06    Ca    9.0      29 Oct 2019 17:31      PT/INR - ( 29 Oct 2019 17:31 )   PT: 13.2 sec;   INR: 1.16          PTT - ( 29 Oct 2019 17:31 )  PTT:29.6 sec      RADIOLOGY, EKG & ADDITIONAL TESTS:  No radiologic tests

## 2019-10-29 NOTE — H&P ADULT - NSHPPHYSICALEXAM_GEN_ALL_CORE
VITAL SIGNS:  T(C): 37.3 (10-30-19 @ 00:45), Max: 37.3 (10-30-19 @ 00:45)  T(F): 99.1 (10-30-19 @ 00:45), Max: 99.1 (10-30-19 @ 00:45)  HR: 82 (10-30-19 @ 00:45) (75 - 98)  BP: 147/83 (10-30-19 @ 00:45) (132/84 - 153/90)  BP(mean): --  RR: 16 (10-30-19 @ 00:45) (16 - 18)  SpO2: 98% (10-30-19 @ 00:45) (97% - 99%)  Wt(kg): --    PHYSICAL EXAM:    Constitutional: WDWN, lying comfortably in bed, NAD  HEENT: Nc/At, PERRL, EOMI, clear conjunctiva, MMM  Neck: supple; no JVD  Respiratory: CTA b/l, no wheezes, rales, or rhonchi  Cardiac: +S1/S2, +RRR, no murmurs, rubs, or gallops  Gastrointestinal: soft, non-tender, non-distended, normoactive bowel sounds x4  Back: spine midline, no bony tenderness or step-offs; no CVAT B/L  Extremities: WWP, no clubbing or cyanosis, no peripheral edema, LLE large erythema extending from distal thigh to mid shin, with increased warmth, moderate swelling at knee, no crepitus, now dressed with ACE bandage  Vascular: 2+ radial and DP pulses b/l  Neurologic: AAOx3, CNII-XII grossly intact, no focal deficits, 5/5 strength b/l UE and LE  Psychiatric: affect and characteristics of appearance, verbalizations, behaviors are appropriate

## 2019-10-29 NOTE — ED ADULT NURSE NOTE - ADDITIONAL COMPLAINTS
I have personally performed a face to face diagnostic evaluation on this patient. I have reviewed the ACP note and agree with the history, exam and plan of care, except as noted.
Additional Complaints

## 2019-10-29 NOTE — ED PROVIDER NOTE - DIAGNOSTIC INTERPRETATION
Knee XR INTERPRETATION:  no acute fracture; + soft tissue swelling noted; + knee effusion; normal bony alignment.

## 2019-10-29 NOTE — ED PROVIDER NOTE - PROGRESS NOTE DETAILS
Ortho consulted and will see the pt Pt seen by ortho - septic bursitis w/ cellulitis. no tap at this time, as it may worsen infection. admit to medicine, they will follow Spoke to LEANDRO who wants pt admitted to her service

## 2019-10-29 NOTE — H&P ADULT - PROBLEM SELECTOR PLAN 7
1) PCP Contacted on Admission: (Y/N) --> Name & Phone #: Dr. Barrera, admitting physician, yes  2) Date of Contact with PCP: 10/29/2019  3) PCP Contacted at Discharge: (Y/N, N/A)  4) Summary of Handoff Given to PCP:   5) Post-Discharge Appointment Date and Location:

## 2019-10-29 NOTE — ED ADULT NURSE NOTE - CHPI ED NUR SYMPTOMS NEG
no nausea/no weakness/no fever/no decreased eating/drinking/no chills/no vomiting/no tingling/no dizziness

## 2019-10-29 NOTE — ED PROVIDER NOTE - OBJECTIVE STATEMENT
Pt w/ PMHx HIV (CD4 900s, VL undetecable, on HAART, no hx opportunistic infections), HLD, HBV p/w leg redness / swelling. Pt reports sx began 5 days ago w/ an area of redness over the patella. The redness and swelling progressed, and 3 days ago, pt went to , and was started on Bactrim DS BID. Sx continued to progress, pt went back to UC yesterday, and Clindamycin TID was added. Pt also had XR (unknown results) and blood work showing WBC 16. Pt states it is not getting better, and now his foot is swollen, prompting the ED visit. No known fever, but pt reports he has been feeling hot. Pt states he is able to ambulate w/o difficulty and has good ROM of the knee. No hx IVDA or septic joint.

## 2019-10-29 NOTE — ED ADULT TRIAGE NOTE - OTHER COMPLAINTS
pt c.o swelling/pain to L knee that started sat[facility] given [facility]. pt presents with worsening swelling down L leg to foot with increased pain. denies injury/fall.

## 2019-10-29 NOTE — ED PROVIDER NOTE - NS ED ROS FT
Constitutional: See HPI  Eyes: No pain, blurry vision, or discharge.  ENMT: No hearing changes, pain, discharge or infections. No neck pain or stiffness.  Cardiac: No chest pain, SOB or edema. No chest pain with exertion.  Respiratory: No cough or respiratory distress. No hemoptysis. No history of asthma or RAD.  GI: No nausea, vomiting, diarrhea or abdominal pain.  : No dysuria, frequency or burning.  MS: No myalgia, muscle weakness, joint pain or back pain.  Neuro: No headache or weakness. No LOC.  Skin: See HPI  Endocrine: No history of thyroid disease or diabetes.  Except as documented in the HPI, all other systems are negative.

## 2019-10-29 NOTE — ED PROVIDER NOTE - PHYSICAL EXAMINATION
Constitutional: Well appearing, well nourished, awake, alert, oriented to person, place, time/situation and in no apparent distress.  ENMT: Airway patent. Normal MM  Eyes: Clear bilaterally  Musculoskeletal: Range of motion is not limited. FROM L knee. + anterior knee swelling w/ overlying erythema and swelling that extends to the lower leg anteriorly w/ associated foot swelling w/o erythema. no crepitus. no vesicles or bullae. no calf ttp. + anterior knee swelling ttp w/ some fluctuance. 2+ pedal pulses b/l. motor / sensation intact  Neuro: Alert and oriented x 3, face symmetric and speech fluent. Strength 5/5 x 4 ext and symmetric, nml gross motor movement, nml gait. No focal deficits noted.  Skin: Skin normal color for race, warm, dry and intact. See above in MSK  Psych: Alert and oriented to person, place, time/situation. normal mood and affect. no apparent risk to self or others.

## 2019-10-29 NOTE — H&P ADULT - PROBLEM SELECTOR PLAN 3
History of HIV, transmitted from unprotected sex several years ago, no opportunistic infections, last CD4 900s, VLUD  - C/w Biktarvy

## 2019-10-30 DIAGNOSIS — Z91.89 OTHER SPECIFIED PERSONAL RISK FACTORS, NOT ELSEWHERE CLASSIFIED: ICD-10-CM

## 2019-10-30 DIAGNOSIS — R63.8 OTHER SYMPTOMS AND SIGNS CONCERNING FOOD AND FLUID INTAKE: ICD-10-CM

## 2019-10-30 DIAGNOSIS — Z29.9 ENCOUNTER FOR PROPHYLACTIC MEASURES, UNSPECIFIED: ICD-10-CM

## 2019-10-30 DIAGNOSIS — E78.5 HYPERLIPIDEMIA, UNSPECIFIED: ICD-10-CM

## 2019-10-30 DIAGNOSIS — M71.10 OTHER INFECTIVE BURSITIS, UNSPECIFIED SITE: ICD-10-CM

## 2019-10-30 DIAGNOSIS — L03.116 CELLULITIS OF LEFT LOWER LIMB: ICD-10-CM

## 2019-10-30 DIAGNOSIS — B20 HUMAN IMMUNODEFICIENCY VIRUS [HIV] DISEASE: ICD-10-CM

## 2019-10-30 LAB
ANION GAP SERPL CALC-SCNC: 9 MMOL/L — SIGNIFICANT CHANGE UP (ref 5–17)
BUN SERPL-MCNC: 19 MG/DL — SIGNIFICANT CHANGE UP (ref 7–23)
CALCIUM SERPL-MCNC: 8.7 MG/DL — SIGNIFICANT CHANGE UP (ref 8.4–10.5)
CHLORIDE SERPL-SCNC: 101 MMOL/L — SIGNIFICANT CHANGE UP (ref 96–108)
CO2 SERPL-SCNC: 25 MMOL/L — SIGNIFICANT CHANGE UP (ref 22–31)
CREAT SERPL-MCNC: 1.1 MG/DL — SIGNIFICANT CHANGE UP (ref 0.5–1.3)
GLUCOSE SERPL-MCNC: 124 MG/DL — HIGH (ref 70–99)
HCT VFR BLD CALC: 42.4 % — SIGNIFICANT CHANGE UP (ref 39–50)
HGB BLD-MCNC: 13.9 G/DL — SIGNIFICANT CHANGE UP (ref 13–17)
MAGNESIUM SERPL-MCNC: 2.1 MG/DL — SIGNIFICANT CHANGE UP (ref 1.6–2.6)
MCHC RBC-ENTMCNC: 30.1 PG — SIGNIFICANT CHANGE UP (ref 27–34)
MCHC RBC-ENTMCNC: 32.8 GM/DL — SIGNIFICANT CHANGE UP (ref 32–36)
MCV RBC AUTO: 91.8 FL — SIGNIFICANT CHANGE UP (ref 80–100)
NRBC # BLD: 0 /100 WBCS — SIGNIFICANT CHANGE UP (ref 0–0)
PLATELET # BLD AUTO: 284 K/UL — SIGNIFICANT CHANGE UP (ref 150–400)
POTASSIUM SERPL-MCNC: 4.4 MMOL/L — SIGNIFICANT CHANGE UP (ref 3.5–5.3)
POTASSIUM SERPL-SCNC: 4.4 MMOL/L — SIGNIFICANT CHANGE UP (ref 3.5–5.3)
RBC # BLD: 4.62 M/UL — SIGNIFICANT CHANGE UP (ref 4.2–5.8)
RBC # FLD: 14 % — SIGNIFICANT CHANGE UP (ref 10.3–14.5)
SODIUM SERPL-SCNC: 135 MMOL/L — SIGNIFICANT CHANGE UP (ref 135–145)
WBC # BLD: 9.56 K/UL — SIGNIFICANT CHANGE UP (ref 3.8–10.5)
WBC # FLD AUTO: 9.56 K/UL — SIGNIFICANT CHANGE UP (ref 3.8–10.5)

## 2019-10-30 RX ORDER — EMTRICITABINE AND TENOFOVIR DISOPROXIL FUMARATE 200; 300 MG/1; MG/1
1 TABLET, FILM COATED ORAL
Qty: 0 | Refills: 0 | DISCHARGE

## 2019-10-30 RX ORDER — KETOROLAC TROMETHAMINE 30 MG/ML
15 SYRINGE (ML) INJECTION ONCE
Refills: 0 | Status: DISCONTINUED | OUTPATIENT
Start: 2019-10-30 | End: 2019-10-30

## 2019-10-30 RX ORDER — ATORVASTATIN CALCIUM 80 MG/1
20 TABLET, FILM COATED ORAL AT BEDTIME
Refills: 0 | Status: DISCONTINUED | OUTPATIENT
Start: 2019-10-30 | End: 2019-11-03

## 2019-10-30 RX ORDER — DOLUTEGRAVIR SODIUM 25 MG/1
1 TABLET, FILM COATED ORAL
Qty: 0 | Refills: 0 | DISCHARGE

## 2019-10-30 RX ADMIN — Medication 650 MILLIGRAM(S): at 12:14

## 2019-10-30 RX ADMIN — ATORVASTATIN CALCIUM 20 MILLIGRAM(S): 80 TABLET, FILM COATED ORAL at 21:50

## 2019-10-30 RX ADMIN — Medication 166.67 MILLIGRAM(S): at 18:07

## 2019-10-30 RX ADMIN — Medication 166.67 MILLIGRAM(S): at 06:04

## 2019-10-30 RX ADMIN — Medication 650 MILLIGRAM(S): at 06:06

## 2019-10-30 RX ADMIN — BICTEGRAVIR SODIUM, EMTRICITABINE, AND TENOFOVIR ALAFENAMIDE FUMARATE 1 TABLET(S): 30; 120; 15 TABLET ORAL at 06:00

## 2019-10-30 RX ADMIN — Medication 650 MILLIGRAM(S): at 06:59

## 2019-10-30 RX ADMIN — Medication 650 MILLIGRAM(S): at 21:50

## 2019-10-30 RX ADMIN — Medication 15 MILLIGRAM(S): at 16:56

## 2019-10-30 RX ADMIN — Medication 650 MILLIGRAM(S): at 22:50

## 2019-10-30 RX ADMIN — Medication 15 MILLIGRAM(S): at 17:50

## 2019-10-30 RX ADMIN — Medication 650 MILLIGRAM(S): at 13:15

## 2019-10-30 NOTE — PROGRESS NOTE ADULT - PROBLEM SELECTOR PLAN 1
LLE swelling, erythema, pain, worsening over 5 days, s/p bactrim and clindamycin tx; pt w/ hx of MRSA cellulitis 10 years ago (left lateral proximal thigh) s/p I&D. Given history of MRSA cellulitis and HIV status, concern for MRSA cellulitis  -downtrending WBC from 16 (outpatient) to 9.6 today  -S/p vancomycin 1g in the ED   -C/w vancomycin 1250 mg q12h (started on 10/29)  -blood cx NGTD  -Tylenol for pain control LLE swelling, erythema, pain, worsening over 5 days, s/p bactrim and clindamycin tx; pt w/ hx of MRSA cellulitis 10 years ago (left lateral proximal thigh) s/p I&D. Given history of MRSA cellulitis and HIV status, concern for MRSA cellulitis  -downtrending WBC from 16 (outpatient) to 9.6 today  -S/p vancomycin 1g in the ED   -C/w vancomycin 1250 mg q12h (started on 10/29)  -blood cx NGTD  -Tylenol for pain control  - Check Vancomycin trough

## 2019-10-30 NOTE — PROGRESS NOTE ADULT - SUBJECTIVE AND OBJECTIVE BOX
SUBJECTIVE / INTERVAL HPI: Patient seen and examined at bedside. Reports pain and burning sensation in the LLE. Denies Pain in the right leg or other extremities or other joint pain. Denies dizziness, headache, CP, abdominal pain, N/V/F/C.     VITAL SIGNS:  Vital Signs Last 24 Hrs  T(C): 36.8 (30 Oct 2019 08:48), Max: 37.3 (30 Oct 2019 00:45)  T(F): 98.2 (30 Oct 2019 08:48), Max: 99.1 (30 Oct 2019 00:45)  HR: 71 (30 Oct 2019 08:48) (71 - 98)  BP: 133/81 (30 Oct 2019 08:48) (132/84 - 153/90)  BP(mean): --  RR: 15 (30 Oct 2019 08:48) (15 - 18)  SpO2: 97% (30 Oct 2019 08:48) (96% - 99%)    10-29-19 @ 07:01  -  10-30-19 @ 07:00  --------------------------------------------------------  IN: 250 mL / OUT: 800 mL / NET: -550 mL    10-30-19 @ 07:01  -  10-30-19 @ 14:00  --------------------------------------------------------  IN: 360 mL / OUT: 1100 mL / NET: -740 mL        PHYSICAL EXAM:    General: WDWN  HEENT: NC/AT; PERRL, anicteric sclera; MMM  Neck: supple  Cardiovascular: +S1/S2; RRR  Respiratory: CTA B/L; no W/R/R  Gastrointestinal: soft, NT/ND; +BSx4  Extremities: WWP, LLE large erythema extending from distal thigh to mid shin, with increased warmth, moderate swelling at knee, no crepitus, now dressed with ACE bandage  Vascular: 2+ radial, DP/PT pulses B/L  Neurological: AAOx3; no focal deficits    MEDICATIONS:  MEDICATIONS  (STANDING):  atorvastatin 20 milliGRAM(s) Oral at bedtime  bictegravir 50 mG/emtricitabine 200 mG/tenofovir alafenamide 25 mG (BIKTARVY) 1 Tablet(s) Oral every 24 hours  vancomycin  IVPB 1250 milliGRAM(s) IV Intermittent every 12 hours    MEDICATIONS  (PRN):  acetaminophen   Tablet .. 650 milliGRAM(s) Oral every 6 hours PRN Moderate Pain (4 - 6)    ALLERGIES:  Allergies    penicillin (Rash)    Intolerances    LABS:                        13.9   9.56  )-----------( 284      ( 30 Oct 2019 07:16 )             42.4     10-30    135  |  101  |  19  ----------------------------<  124<H>  4.4   |  25  |  1.10    Ca    8.7      30 Oct 2019 07:16  Mg     2.1     10-30      PT/INR - ( 29 Oct 2019 17:31 )   PT: 13.2 sec;   INR: 1.16          PTT - ( 29 Oct 2019 17:31 )  PTT:29.6 sec    CAPILLARY BLOOD GLUCOSE      Culture - Blood (collected 10-29-19 @ 20:51)  Source: .Blood Blood-Peripheral  Preliminary Report (10-30-19 @ 09:01):    No growth at 12 hours    Culture - Blood (collected 10-29-19 @ 20:51)  Source: .Blood Blood-Peripheral  Preliminary Report (10-30-19 @ 09:01):    No growth at 12 hours    RADIOLOGY & ADDITIONAL TESTS: Reviewed. SUBJECTIVE / INTERVAL HPI: Patient seen and examined at bedside. Reports pain and burning sensation in the LLE. Denies Pain in the right leg or other extremities or other joint pain. Denies dizziness, headache, CP, abdominal pain, N/V/F/C.     VITAL SIGNS:  Vital Signs Last 24 Hrs  T(C): 36.8 (30 Oct 2019 08:48), Max: 37.3 (30 Oct 2019 00:45)  T(F): 98.2 (30 Oct 2019 08:48), Max: 99.1 (30 Oct 2019 00:45)  HR: 71 (30 Oct 2019 08:48) (71 - 98)  BP: 133/81 (30 Oct 2019 08:48) (132/84 - 153/90)  BP(mean): --  RR: 15 (30 Oct 2019 08:48) (15 - 18)  SpO2: 97% (30 Oct 2019 08:48) (96% - 99%)    10-29-19 @ 07:01  -  10-30-19 @ 07:00  --------------------------------------------------------  IN: 250 mL / OUT: 800 mL / NET: -550 mL    10-30-19 @ 07:01  -  10-30-19 @ 14:00  --------------------------------------------------------  IN: 360 mL / OUT: 1100 mL / NET: -740 mL        PHYSICAL EXAM:    General: WDWN  HEENT: NC/AT; PERRL, anicteric sclera; MMM  Neck: supple  Cardiovascular: +S1/S2; RRR  Respiratory: CTA B/L; no W/R/R  Gastrointestinal: soft, NT/ND; +BSx4  Extremities: WWP, LLE large erythema extending from distal thigh to mid shin, with increased warmth, moderate swelling at knee, no crepitus, now dressed with ACE bandage and decreased from last night marked line  Vascular: 2+ radial, DP/PT pulses B/L  Neurological: AAOx3; no focal deficits    MEDICATIONS:  MEDICATIONS  (STANDING):  atorvastatin 20 milliGRAM(s) Oral at bedtime  bictegravir 50 mG/emtricitabine 200 mG/tenofovir alafenamide 25 mG (BIKTARVY) 1 Tablet(s) Oral every 24 hours  vancomycin  IVPB 1250 milliGRAM(s) IV Intermittent every 12 hours    MEDICATIONS  (PRN):  acetaminophen   Tablet .. 650 milliGRAM(s) Oral every 6 hours PRN Moderate Pain (4 - 6)    ALLERGIES:  Allergies    penicillin (Rash)    Intolerances    LABS:                        13.9   9.56  )-----------( 284      ( 30 Oct 2019 07:16 )             42.4     10-30    135  |  101  |  19  ----------------------------<  124<H>  4.4   |  25  |  1.10    Ca    8.7      30 Oct 2019 07:16  Mg     2.1     10-30      PT/INR - ( 29 Oct 2019 17:31 )   PT: 13.2 sec;   INR: 1.16          PTT - ( 29 Oct 2019 17:31 )  PTT:29.6 sec    CAPILLARY BLOOD GLUCOSE      Culture - Blood (collected 10-29-19 @ 20:51)  Source: .Blood Blood-Peripheral  Preliminary Report (10-30-19 @ 09:01):    No growth at 12 hours    Culture - Blood (collected 10-29-19 @ 20:51)  Source: .Blood Blood-Peripheral  Preliminary Report (10-30-19 @ 09:01):    No growth at 12 hours    RADIOLOGY & ADDITIONAL TESTS: Reviewed.

## 2019-10-30 NOTE — PROGRESS NOTE ADULT - PROBLEM SELECTOR PLAN 7
1) PCP Contacted on Admission: (Y/N) --> Name & Phone #: Dr. Barrera, admitting physician, yes  2) Date of Contact with PCP: 10/29/2019  3) PCP Contacted at Discharge: (Y/N, N/A)  4) Summary of Handoff Given to PCP:   5) Post-Discharge Appointment Date and Location: 1) PCP Contacted on Admission: (Y) --> Name & Phone #: Dr. Barrera, admitting physician, yes  2) Date of Contact with PCP: 10/29/2019  3) PCP Contacted at Discharge: (N/A)  4) Summary of Handoff Given to PCP:   5) Post-Discharge Appointment Date and Location:

## 2019-10-30 NOTE — PROGRESS NOTE ADULT - ASSESSMENT
55yo M with PMHx of HIV (CD4 900s, VL undetectable, on Biktarvy, no hx of opportunistic infections), HLD, Hep B (inactive), presents with LLE swelling, erythema, pain, worsening over 5 days, s/p bactrim and clindamycin tx. Admitted for cellulitis 53yo M with PMHx of HIV (CD4 900s, VL undetectable, on Biktarvy, no hx of opportunistic infections,  HLD, Hep B and Hepatitis C erology (+) but negative viral load, presents with L leg prepatelar swelling, erythema, pain, worsening over 5 days, failed oral outpatient  bactrim and clindamycin , admitted for  further evaluation and IV antibiotics

## 2019-10-30 NOTE — PROGRESS NOTE ADULT - PROBLEM SELECTOR PLAN 2
Orthopedic surgery consulted for concern for septic bursitis/arthritis  -XR of left knee shows soft tissue swelling in prepatellar tissue and joint with full ROM   -prepatellar bursitis, no tap for now since at risk for introduction of bacteria in the setting of cellulitis, per ortho recs  -c/w Compressive ACE wrap on LLE  -pt to f/u with Dr. Sadler in office in 10-14 days after discharge  -F/u ESR/CRP Orthopedic surgery consulted for concern for septic bursitis/arthritis  -XR of left knee shows soft tissue swelling in prepatellar tissue and joint with full ROM   -prepatellar bursitis, no tap for now since at risk for introduction of bacteria in the setting of cellulitis, per ortho recs  -c/w Compressive ACE wrap on LLE  -pt to f/u with Dr. Sadler in office in 10-14 days after discharge  -F/u ESR/CRP  -LLE MRI with and without contrast

## 2019-10-31 LAB
ANION GAP SERPL CALC-SCNC: 12 MMOL/L — SIGNIFICANT CHANGE UP (ref 5–17)
BUN SERPL-MCNC: 20 MG/DL — SIGNIFICANT CHANGE UP (ref 7–23)
CALCIUM SERPL-MCNC: 9.4 MG/DL — SIGNIFICANT CHANGE UP (ref 8.4–10.5)
CHLORIDE SERPL-SCNC: 103 MMOL/L — SIGNIFICANT CHANGE UP (ref 96–108)
CO2 SERPL-SCNC: 24 MMOL/L — SIGNIFICANT CHANGE UP (ref 22–31)
CREAT SERPL-MCNC: 1.16 MG/DL — SIGNIFICANT CHANGE UP (ref 0.5–1.3)
GLUCOSE SERPL-MCNC: 107 MG/DL — HIGH (ref 70–99)
HCT VFR BLD CALC: 49.9 % — SIGNIFICANT CHANGE UP (ref 39–50)
HGB BLD-MCNC: 16.1 G/DL — SIGNIFICANT CHANGE UP (ref 13–17)
MAGNESIUM SERPL-MCNC: 2.2 MG/DL — SIGNIFICANT CHANGE UP (ref 1.6–2.6)
MCHC RBC-ENTMCNC: 30.4 PG — SIGNIFICANT CHANGE UP (ref 27–34)
MCHC RBC-ENTMCNC: 32.3 GM/DL — SIGNIFICANT CHANGE UP (ref 32–36)
MCV RBC AUTO: 94.3 FL — SIGNIFICANT CHANGE UP (ref 80–100)
MRSA PCR RESULT.: NEGATIVE — SIGNIFICANT CHANGE UP
NRBC # BLD: 0 /100 WBCS — SIGNIFICANT CHANGE UP (ref 0–0)
PLATELET # BLD AUTO: 362 K/UL — SIGNIFICANT CHANGE UP (ref 150–400)
POTASSIUM SERPL-MCNC: 4.9 MMOL/L — SIGNIFICANT CHANGE UP (ref 3.5–5.3)
POTASSIUM SERPL-SCNC: 4.9 MMOL/L — SIGNIFICANT CHANGE UP (ref 3.5–5.3)
RBC # BLD: 5.29 M/UL — SIGNIFICANT CHANGE UP (ref 4.2–5.8)
RBC # FLD: 13.8 % — SIGNIFICANT CHANGE UP (ref 10.3–14.5)
S AUREUS DNA NOSE QL NAA+PROBE: POSITIVE
SODIUM SERPL-SCNC: 139 MMOL/L — SIGNIFICANT CHANGE UP (ref 135–145)
VANCOMYCIN TROUGH SERPL-MCNC: 8.7 UG/ML — LOW (ref 10–20)
WBC # BLD: 10.4 K/UL — SIGNIFICANT CHANGE UP (ref 3.8–10.5)
WBC # FLD AUTO: 10.4 K/UL — SIGNIFICANT CHANGE UP (ref 3.8–10.5)

## 2019-10-31 RX ORDER — VANCOMYCIN HCL 1 G
1500 VIAL (EA) INTRAVENOUS EVERY 12 HOURS
Refills: 0 | Status: DISCONTINUED | OUTPATIENT
Start: 2019-10-31 | End: 2019-11-02

## 2019-10-31 RX ORDER — PANTOPRAZOLE SODIUM 20 MG/1
40 TABLET, DELAYED RELEASE ORAL
Refills: 0 | Status: DISCONTINUED | OUTPATIENT
Start: 2019-10-31 | End: 2019-11-03

## 2019-10-31 RX ADMIN — PANTOPRAZOLE SODIUM 40 MILLIGRAM(S): 20 TABLET, DELAYED RELEASE ORAL at 15:25

## 2019-10-31 RX ADMIN — Medication 300 MILLIGRAM(S): at 21:25

## 2019-10-31 RX ADMIN — Medication 300 MILLIGRAM(S): at 10:46

## 2019-10-31 RX ADMIN — Medication 650 MILLIGRAM(S): at 22:25

## 2019-10-31 RX ADMIN — Medication 650 MILLIGRAM(S): at 21:25

## 2019-10-31 RX ADMIN — ATORVASTATIN CALCIUM 20 MILLIGRAM(S): 80 TABLET, FILM COATED ORAL at 21:23

## 2019-10-31 RX ADMIN — BICTEGRAVIR SODIUM, EMTRICITABINE, AND TENOFOVIR ALAFENAMIDE FUMARATE 1 TABLET(S): 30; 120; 15 TABLET ORAL at 06:43

## 2019-10-31 NOTE — PROGRESS NOTE ADULT - SUBJECTIVE AND OBJECTIVE BOX
OVERNIGHT EVENTS: VINI    SUBJECTIVE / INTERVAL HPI: Patient seen and examined at bedside. Reports pain and burning sensation in the LLE. Denies Pain in the right leg or other extremities or other joint pain. Denies dizziness, headache, CP, abdominal pain, N/V/F/C.     VITAL SIGNS:  Vital Signs Last 24 Hrs  T(C): 37.7 (31 Oct 2019 13:11), Max: 37.7 (31 Oct 2019 13:11)  T(F): 99.9 (31 Oct 2019 13:11), Max: 99.9 (31 Oct 2019 13:11)  HR: 101 (31 Oct 2019 13:11) (72 - 101)  BP: 150/84 (31 Oct 2019 13:11) (120/77 - 150/84)  BP(mean): --  RR: 18 (31 Oct 2019 13:11) (16 - 18)  SpO2: 96% (31 Oct 2019 13:11) (96% - 99%)    PHYSICAL EXAM:    General: WDWN  HEENT: NC/AT; PERRL, anicteric sclera; MMM  Neck: supple  Cardiovascular: +S1/S2; RRR  Respiratory: CTA B/L; no W/R/R  Gastrointestinal: soft, NT/ND; +BSx4  Extremities: WWP, LLE large erythema extending from distal thigh to mid shin, with increased warmth, moderate swelling at knee, no crepitus, now dressed with ACE bandage and decreased from last night marked line  Vascular: 2+ radial, DP/PT pulses B/L  Neurological: AAOx3; no focal deficits    MEDICATIONS:  MEDICATIONS  (STANDING):  atorvastatin 20 milliGRAM(s) Oral at bedtime  bictegravir 50 mG/emtricitabine 200 mG/tenofovir alafenamide 25 mG (BIKTARVY) 1 Tablet(s) Oral every 24 hours  vancomycin  IVPB 1500 milliGRAM(s) IV Intermittent every 12 hours    MEDICATIONS  (PRN):  acetaminophen   Tablet .. 650 milliGRAM(s) Oral every 6 hours PRN Moderate Pain (4 - 6)    ALLERGIES:  .  LABS:                         16.1   10.40 )-----------( 362      ( 31 Oct 2019 08:02 )             49.9     10-31    139  |  103  |  20  ----------------------------<  107<H>  4.9   |  24  |  1.16    Ca    9.4      31 Oct 2019 08:02  Mg     2.2     10-31      PT/INR - ( 29 Oct 2019 17:31 )   PT: 13.2 sec;   INR: 1.16          PTT - ( 29 Oct 2019 17:31 )  PTT:29.6 sec      RADIOLOGY, EKG & ADDITIONAL TESTS: Reviewed. Allergies    penicillin (Rash)    Intolerances    Culture - Blood (collected 10-29-19 @ 20:51)  Source: .Blood Blood-Peripheral  Preliminary Report (10-30-19 @ 09:01):    No growth at 12 hours    Culture - Blood (collected 10-29-19 @ 20:51)  Source: .Blood Blood-Peripheral  Preliminary Report (10-30-19 @ 09:01):    No growth at 12 hours OVERNIGHT EVENTS: VINI    SUBJECTIVE / INTERVAL HPI: Patient seen and examined at bedside. Reports pain and burning sensation in the LLE. Denies Pain in the right leg or other extremities or other joint pain. Denies dizziness, headache, CP, abdominal pain, N/V/F/C.     VITAL SIGNS:  Vital Signs Last 24 Hrs  T(C): 37.7 (31 Oct 2019 13:11), Max: 37.7 (31 Oct 2019 13:11)  T(F): 99.9 (31 Oct 2019 13:11), Max: 99.9 (31 Oct 2019 13:11)  HR: 101 (31 Oct 2019 13:11) (72 - 101)  BP: 150/84 (31 Oct 2019 13:11) (120/77 - 150/84)  BP(mean): --  RR: 18 (31 Oct 2019 13:11) (16 - 18)  SpO2: 96% (31 Oct 2019 13:11) (96% - 99%)    PHYSICAL EXAM:    General: WDWN  HEENT: NC/AT; PERRL, anicteric sclera; MMM  Neck: supple  Cardiovascular: +S1/S2; RRR  Respiratory: CTA B/L; no W/R/R  Gastrointestinal: soft, NT/ND; +BSx4  Extremities: WWP, LLE decreased large erythema extending from distal thigh to mid shin,now moderate swelling at knee, no crepitus,   Vascular: 2+ radial, DP/PT pulses B/L  Neurological: AAOx3; no focal deficits    MEDICATIONS:  MEDICATIONS  (STANDING):  atorvastatin 20 milliGRAM(s) Oral at bedtime  bictegravir 50 mG/emtricitabine 200 mG/tenofovir alafenamide 25 mG (BIKTARVY) 1 Tablet(s) Oral every 24 hours  vancomycin  IVPB 1500 milliGRAM(s) IV Intermittent every 12 hours    MEDICATIONS  (PRN):  acetaminophen   Tablet .. 650 milliGRAM(s) Oral every 6 hours PRN Moderate Pain (4 - 6)    ALLERGIES: penicillin  .  LABS:                         16.1   10.40 )-----------( 362      ( 31 Oct 2019 08:02 )             49.9     10-31    139  |  103  |  20  ----------------------------<  107<H>  4.9   |  24  |  1.16    Ca    9.4      31 Oct 2019 08:02  Mg     2.2     10-31      PT/INR - ( 29 Oct 2019 17:31 )   PT: 13.2 sec;   INR: 1.16          PTT - ( 29 Oct 2019 17:31 )  PTT:29.6 sec      RADIOLOGY, EKG & ADDITIONAL TESTS: Reviewed. Allergies    Culture - Blood (collected 10-29-19 @ 20:51)  Source: .Blood Blood-Peripheral  Preliminary Report (10-30-19 @ 09:01):    No growth at 12 hours    Culture - Blood (collected 10-29-19 @ 20:51)  Source: .Blood Blood-Peripheral  Preliminary Report (10-30-19 @ 09:01):    No growth at 12 hours

## 2019-10-31 NOTE — PROGRESS NOTE ADULT - SUBJECTIVE AND OBJECTIVE BOX
S: No acute events overnight. ESR 34  WBC downtrending to 10.4    Vital Signs Last 24 Hrs  T(C): 37.7 (31 Oct 2019 13:11), Max: 37.7 (31 Oct 2019 13:11)  T(F): 99.9 (31 Oct 2019 13:11), Max: 99.9 (31 Oct 2019 13:11)  HR: 101 (31 Oct 2019 13:11) (72 - 101)  BP: 150/84 (31 Oct 2019 13:11) (120/77 - 150/84)  BP(mean): --  RR: 18 (31 Oct 2019 13:11) (16 - 18)  SpO2: 96% (31 Oct 2019 13:11) (96% - 99%)    Gen: well developed, well nourished, comfortable  Affected extremity: LLE ACE wrap in place  cellultiis extending from knee down to ankle joint diffusely  prepatellar bursa inflamed and tender. effusion noted  full ROM of knee       Motor: 5/5 GS/TA/EHL      Sensation: SILT sp/dp/mayen/saph/t      wwp <2 sec cap refill           16.1   10.40 )-----------( 362      ( 31 Oct 2019 08:02 )             49.9     10-31    139  |  103  |  20  ----------------------------<  107<H>  4.9   |  24  |  1.16    Ca    9.4      31 Oct 2019 08:02  Mg     2.2     10-31      MEDICATIONS  (STANDING):    MEDICATIONS  (PRN):      A/P:  55yo Male s/p L knee prepatellar bursitis  -Compressive ACE wrap on LLE  -WBAT LLE  -ABX per ID  -trend inflammatory markers  -f/u with Dr. Sadler in office in 10-14 days

## 2019-10-31 NOTE — PROGRESS NOTE ADULT - PROBLEM SELECTOR PLAN 7
1) PCP Contacted on Admission: (Y) --> Name & Phone #: Dr. Barrera, admitting physician, yes  2) Date of Contact with PCP: 10/29/2019  3) PCP Contacted at Discharge: (N/A)  4) Summary of Handoff Given to PCP:   5) Post-Discharge Appointment Date and Location:

## 2019-10-31 NOTE — PROGRESS NOTE ADULT - ASSESSMENT
53yo M with PMHx of HIV (CD4 900s, VL undetectable, on Biktarvy, no hx of opportunistic infections,  HLD, Hep B and Hepatitis C serology (+) but negative viral load, presents with L leg prepatelar swelling, erythema, pain, worsening over 5 days, failed oral outpatient  bactrim and clindamycin , admitted for  further evaluation and IV antibiotics 55yo M with PMHx of HIV (CD4 900s, VL undetectable, on Biktarvy, no hx of opportunistic infections,  HLD, Hep B and Hepatitis C serology (+) but negative viral load, presents with L leg prepatelar swelling, erythema, pain, worsening over 5 days, failed oral outpatient  bactrim and clindamycin , admitted for  further evaluation and IV antibiotics, improving

## 2019-10-31 NOTE — PROGRESS NOTE ADULT - PROBLEM SELECTOR PLAN 2
Orthopedic surgery consulted for concern for septic bursitis/arthritis  -XR of left knee shows soft tissue swelling in prepatellar tissue and joint with full ROM   -prepatellar bursitis, no tap for now since at risk for introduction of bacteria in the setting of cellulitis, per ortho recs  -c/w Compressive ACE wrap on LLE  -pending MRI w and wo LLE  -pt to f/u with Dr. Sadler in office in 10-14 days after discharge

## 2019-10-31 NOTE — PROGRESS NOTE ADULT - PROBLEM SELECTOR PLAN 1
LLE swelling, erythema, pain, worsening over 5 days, s/p bactrim and clindamycin tx; pt w/ hx of MRSA cellulitis 10 years ago (left lateral proximal thigh) s/p I&D. Given history of MRSA cellulitis and HIV status, concern for MRSA cellulitis  -downtrending WBC from 16 (outpatient) to 10.4 today  -S/p vancomycin 1g in the ED   -vancomycin increased to 1500 mg q12h (started on 10/29)  -blood cx NGTD  -Tylenol for pain control  -pending MRI LE

## 2019-11-01 LAB
GRAM STN FLD: SIGNIFICANT CHANGE UP
SPECIMEN SOURCE: SIGNIFICANT CHANGE UP
VANCOMYCIN TROUGH SERPL-MCNC: 14.1 UG/ML — SIGNIFICANT CHANGE UP (ref 10–20)

## 2019-11-01 PROCEDURE — 73723 MRI JOINT LWR EXTR W/O&W/DYE: CPT | Mod: 26,LT

## 2019-11-01 RX ADMIN — Medication 650 MILLIGRAM(S): at 17:27

## 2019-11-01 RX ADMIN — Medication 650 MILLIGRAM(S): at 18:10

## 2019-11-01 RX ADMIN — Medication 300 MILLIGRAM(S): at 10:15

## 2019-11-01 RX ADMIN — Medication 300 MILLIGRAM(S): at 22:54

## 2019-11-01 RX ADMIN — PANTOPRAZOLE SODIUM 40 MILLIGRAM(S): 20 TABLET, DELAYED RELEASE ORAL at 06:35

## 2019-11-01 RX ADMIN — BICTEGRAVIR SODIUM, EMTRICITABINE, AND TENOFOVIR ALAFENAMIDE FUMARATE 1 TABLET(S): 30; 120; 15 TABLET ORAL at 06:34

## 2019-11-01 RX ADMIN — ATORVASTATIN CALCIUM 20 MILLIGRAM(S): 80 TABLET, FILM COATED ORAL at 21:28

## 2019-11-01 NOTE — PROGRESS NOTE ADULT - SUBJECTIVE AND OBJECTIVE BOX
OVERNIGHT EVENTS: VINI    SUBJECTIVE / INTERVAL HPI: Patient seen and examined at bedside. Reports pain and burning sensation in the LLE. Denies Pain in the right leg or other extremities or other joint pain. Denies dizziness, headache, CP, abdominal pain, N/V/F/C.     VITAL SIGNS:  Vital Signs Last 24 Hrs  T(C): 36.9 (01 Nov 2019 12:39), Max: 37.6 (31 Oct 2019 21:13)  T(F): 98.4 (01 Nov 2019 12:39), Max: 99.6 (31 Oct 2019 21:13)  HR: 66 (01 Nov 2019 12:39) (66 - 88)  BP: 136/88 (01 Nov 2019 12:39) (136/88 - 159/81)  BP(mean): --  RR: 17 (01 Nov 2019 12:39) (17 - 20)  SpO2: 98% (01 Nov 2019 12:39) (95% - 98%)    PHYSICAL EXAM:    General: WDWN  HEENT: NC/AT; PERRL, anicteric sclera; MMM  Neck: supple  Cardiovascular: +S1/S2; RRR  Respiratory: CTA B/L; no W/R/R  Gastrointestinal: soft, NT/ND; +BSx4  Extremities: WWP, LLE decreased large erythema extending from distal thigh to mid shin, moderate swelling at knee, no crepitus,   Vascular: 2+ radial, DP/PT pulses B/L  Neurological: AAOx3; no focal deficits    MEDICATIONS:  MEDICATIONS  (STANDING):  atorvastatin 20 milliGRAM(s) Oral at bedtime  bictegravir 50 mG/emtricitabine 200 mG/tenofovir alafenamide 25 mG (BIKTARVY) 1 Tablet(s) Oral every 24 hours  pantoprazole    Tablet 40 milliGRAM(s) Oral before breakfast  vancomycin  IVPB 1500 milliGRAM(s) IV Intermittent every 12 hours    MEDICATIONS  (PRN):  acetaminophen   Tablet .. 650 milliGRAM(s) Oral every 6 hours PRN Moderate Pain (4 - 6)    ALLERGIES: penicillin    .  LABS:                         16.1   10.40 )-----------( 362      ( 31 Oct 2019 08:02 )             49.9     10-31    139  |  103  |  20  ----------------------------<  107<H>  4.9   |  24  |  1.16    Ca    9.4      31 Oct 2019 08:02  Mg     2.2     10-31    Culture - Blood (10.29.19 @ 20:51)    Specimen Source: .Blood Blood-Peripheral    Culture Results:   No growth at 2 days.    RADIOLOGY, EKG & ADDITIONAL TESTS: Reviewed. OVERNIGHT EVENTS: VINI    SUBJECTIVE / INTERVAL HPI: Patient seen and examined at bedside. Reports pain and burning sensation in the LLE. Denies Pain in the right leg or other extremities or other joint pain. Denies dizziness, headache, CP, abdominal pain, N/V/F/C.     VITAL SIGNS:  Vital Signs Last 24 Hrs  T(C): 36.9 (01 Nov 2019 12:39), Max: 37.6 (31 Oct 2019 21:13)  T(F): 98.4 (01 Nov 2019 12:39), Max: 99.6 (31 Oct 2019 21:13)  HR: 66 (01 Nov 2019 12:39) (66 - 88)  BP: 136/88 (01 Nov 2019 12:39) (136/88 - 159/81)  BP(mean): --  RR: 17 (01 Nov 2019 12:39) (17 - 20)  SpO2: 98% (01 Nov 2019 12:39) (95% - 98%)    PHYSICAL EXAM:    General: WDWN  HEENT: NC/AT; PERRL, anicteric sclera; MMM  Neck: supple  Cardiovascular: +S1/S2; RRR  Respiratory: CTA B/L; no W/R/R  Gastrointestinal: soft, NT/ND; +BSx4  Extremities: WWP, LLE decreased large erythema extending from distal thigh to mid shin, moderate swelling at knee, no crepitus,   Vascular: 2+ radial, DP/PT pulses B/L  Neurological: AAOx3; no focal deficits    MEDICATIONS:  MEDICATIONS  (STANDING):  atorvastatin 20 milliGRAM(s) Oral at bedtime  bictegravir 50 mG/emtricitabine 200 mG/tenofovir alafenamide 25 mG (BIKTARVY) 1 Tablet(s) Oral every 24 hours  pantoprazole    Tablet 40 milliGRAM(s) Oral before breakfast  vancomycin  IVPB 1500 milliGRAM(s) IV Intermittent every 12 hours    MEDICATIONS  (PRN):  acetaminophen   Tablet .. 650 milliGRAM(s) Oral every 6 hours PRN Moderate Pain (4 - 6)    ALLERGIES: penicillin    .  LABS:                         16.1   10.40 )-----------( 362      ( 31 Oct 2019 08:02 )             49.9     10-31    139  |  103  |  20  ----------------------------<  107<H>  4.9   |  24  |  1.16    Ca    9.4      31 Oct 2019 08:02  Mg     2.2     10-31    Culture - Blood (10.29.19 @ 20:51)    Specimen Source: .Blood Blood-Peripheral    Culture Results:   No growth at 2 days.    RADIOLOGY, EKG & ADDITIONAL TESTS: Reviewed.    MR Knee w/wo IV Cont, Left (11.01.19 @ 15:07) >  Impression: Soft tissue swelling anterior to the patella with   heterogeneous fluid collection in the region of the prepatellar bursa.   Findings are compatible cellulitis and bursitis. Superimposed infection   is suspected.    No evidence of septic arthritis or osteomyelitis.

## 2019-11-01 NOTE — PROCEDURE NOTE - NSICDXPROCEDURE_GEN_ALL_CORE_FT
PROCEDURES:  Aspiration or injection, or both aspiration and injection, joint or bursa, hip, knee, or shoulder 02-Nov-2019 11:00:31  Homero Jovel

## 2019-11-01 NOTE — DIETITIAN INITIAL EVALUATION ADULT. - OTHER INFO
55y/o HIV+ male with history of HEP C admitted with left leg cellulitis with septic bursitis .Patient expressed his frustration regarding delay with MRI. Denied N/V/D or pain .Skin intact PU wise. Denied any weight changes. Generally eats well balanced diet without any issues.. Presently eating 100%.

## 2019-11-01 NOTE — PROGRESS NOTE ADULT - ASSESSMENT
55yo M with PMHx of HIV (CD4 900s, VL undetectable, on Biktarvy, no hx of opportunistic infections,  HLD, Hep B and Hepatitis C serology (+) but negative viral load, presents with L leg prepatelar swelling, erythema, pain, worsening over 5 days, failed oral outpatient  bactrim and clindamycin , admitted for  further evaluation and IV antibiotics, improving.

## 2019-11-01 NOTE — PROGRESS NOTE ADULT - PROBLEM SELECTOR PLAN 1
LLE swelling, erythema, pain, worsening over 5 days, s/p bactrim and clindamycin tx; pt w/ hx of MRSA cellulitis 10 years ago (left lateral proximal thigh) s/p I&D. Given history of MRSA cellulitis and HIV status, concern for MRSA cellulitis  -downtrending WBC from 16 (outpatient) to 10.4 today  -S/p vancomycin 1g in the ED   -c/w Vancomycin 1500 mg q12h (started on 10/29); check Vanc level at 10PM  -blood cx NGTD  -Tylenol for pain control  -f/u MRI LE LLE swelling, erythema, pain, worsening over 5 days, s/p bactrim and clindamycin tx; pt w/ hx of MRSA cellulitis 10 years ago (left lateral proximal thigh) s/p I&D. Given history of MRSA cellulitis and HIV status, concern for MRSA cellulitis  -downtrending WBC from 16 (outpatient) to 10.4 today  -S/p vancomycin 1g in the ED   -c/w Vancomycin 1500 mg q12h (started on 10/29); check Vanc level at 10PM  -blood cx NGTD  -Tylenol for pain control

## 2019-11-01 NOTE — PROGRESS NOTE ADULT - PROBLEM SELECTOR PLAN 2
Orthopedic surgery consulted for concern for septic bursitis/arthritis  -XR of left knee shows soft tissue swelling in prepatellar tissue and joint with full ROM   -prepatellar bursitis, no tap for now since at risk for introduction of bacteria in the setting of cellulitis, per ortho recs  -c/w Compressive ACE wrap on LLE  -f/u MRI w and wo LLE  -pt to f/u with Dr. Sadler in office in 10-14 days after discharge Orthopedic surgery consulted for concern for septic bursitis/arthritis  -XR of left knee shows soft tissue swelling in prepatellar tissue and joint with full ROM   -MRI LE (+) collection in prepatellar bursa  -d/w Dr Pastor , patient needs aspiration at bedside  -pt to f/u with Dr. Sadler in office in 10-14 days after discharge

## 2019-11-02 DIAGNOSIS — Z21 ASYMPTOMATIC HUMAN IMMUNODEFICIENCY VIRUS [HIV] INFECTION STATUS: ICD-10-CM

## 2019-11-02 DIAGNOSIS — E78.5 HYPERLIPIDEMIA, UNSPECIFIED: ICD-10-CM

## 2019-11-02 LAB
ANION GAP SERPL CALC-SCNC: 9 MMOL/L — SIGNIFICANT CHANGE UP (ref 5–17)
BUN SERPL-MCNC: 21 MG/DL — SIGNIFICANT CHANGE UP (ref 7–23)
CALCIUM SERPL-MCNC: 9.5 MG/DL — SIGNIFICANT CHANGE UP (ref 8.4–10.5)
CHLORIDE SERPL-SCNC: 102 MMOL/L — SIGNIFICANT CHANGE UP (ref 96–108)
CO2 SERPL-SCNC: 27 MMOL/L — SIGNIFICANT CHANGE UP (ref 22–31)
CREAT SERPL-MCNC: 1.27 MG/DL — SIGNIFICANT CHANGE UP (ref 0.5–1.3)
CRP SERPL-MCNC: 3.66 MG/DL — HIGH (ref 0–0.4)
GLUCOSE SERPL-MCNC: 117 MG/DL — HIGH (ref 70–99)
HCT VFR BLD CALC: 49.1 % — SIGNIFICANT CHANGE UP (ref 39–50)
HGB BLD-MCNC: 16 G/DL — SIGNIFICANT CHANGE UP (ref 13–17)
MAGNESIUM SERPL-MCNC: 2.2 MG/DL — SIGNIFICANT CHANGE UP (ref 1.6–2.6)
MCHC RBC-ENTMCNC: 30 PG — SIGNIFICANT CHANGE UP (ref 27–34)
MCHC RBC-ENTMCNC: 32.6 GM/DL — SIGNIFICANT CHANGE UP (ref 32–36)
MCV RBC AUTO: 91.9 FL — SIGNIFICANT CHANGE UP (ref 80–100)
NRBC # BLD: 0 /100 WBCS — SIGNIFICANT CHANGE UP (ref 0–0)
PLATELET # BLD AUTO: 413 K/UL — HIGH (ref 150–400)
POTASSIUM SERPL-MCNC: 4.6 MMOL/L — SIGNIFICANT CHANGE UP (ref 3.5–5.3)
POTASSIUM SERPL-SCNC: 4.6 MMOL/L — SIGNIFICANT CHANGE UP (ref 3.5–5.3)
RBC # BLD: 5.34 M/UL — SIGNIFICANT CHANGE UP (ref 4.2–5.8)
RBC # FLD: 13.4 % — SIGNIFICANT CHANGE UP (ref 10.3–14.5)
SODIUM SERPL-SCNC: 138 MMOL/L — SIGNIFICANT CHANGE UP (ref 135–145)
VANCOMYCIN TROUGH SERPL-MCNC: 14.1 UG/ML — SIGNIFICANT CHANGE UP (ref 10–20)
WBC # BLD: 13.24 K/UL — HIGH (ref 3.8–10.5)
WBC # FLD AUTO: 13.24 K/UL — HIGH (ref 3.8–10.5)

## 2019-11-02 RX ORDER — VANCOMYCIN HCL 1 G
1000 VIAL (EA) INTRAVENOUS EVERY 8 HOURS
Refills: 0 | Status: DISCONTINUED | OUTPATIENT
Start: 2019-11-02 | End: 2019-11-03

## 2019-11-02 RX ADMIN — Medication 650 MILLIGRAM(S): at 16:38

## 2019-11-02 RX ADMIN — Medication 650 MILLIGRAM(S): at 17:30

## 2019-11-02 RX ADMIN — BICTEGRAVIR SODIUM, EMTRICITABINE, AND TENOFOVIR ALAFENAMIDE FUMARATE 1 TABLET(S): 30; 120; 15 TABLET ORAL at 05:35

## 2019-11-02 RX ADMIN — Medication 650 MILLIGRAM(S): at 06:35

## 2019-11-02 RX ADMIN — PANTOPRAZOLE SODIUM 40 MILLIGRAM(S): 20 TABLET, DELAYED RELEASE ORAL at 06:47

## 2019-11-02 RX ADMIN — ATORVASTATIN CALCIUM 20 MILLIGRAM(S): 80 TABLET, FILM COATED ORAL at 21:42

## 2019-11-02 RX ADMIN — Medication 300 MILLIGRAM(S): at 10:13

## 2019-11-02 RX ADMIN — Medication 650 MILLIGRAM(S): at 05:35

## 2019-11-02 NOTE — PROGRESS NOTE ADULT - PROBLEM SELECTOR PLAN 1
Given history of MRSA cellulitis and HIV status, concern for MRSA septic bursitis  -downtrending WBC from 16 (outpatient) to 13.24 today  -c/w Vancomycin 1500 mg q12h (started on 10/29)  -blood cx NGTD  -MRI LE (+) collection in prepatellar bursa  -s/p aspiration on 11/1, now culture (+) Staph aureus  -pt to f/u with Dr. Sadler in office in 10-14 days after discharge.  -Pending St Aureus sensitivity in AM will discharge patient on Bactrim and Rifampin to complete 10 more days

## 2019-11-02 NOTE — PROGRESS NOTE ADULT - ASSESSMENT
55yo M with PMHx of HIV (CD4 900s, VL undetectable, on Biktarvy, no hx of opportunistic infections,  HLD, Hep B and Hepatitis C serology (+) but negative viral load, presents with L leg prepatelar bursitis, failed oral outpatient  bactrim and clindamycin , admitted for  further evaluation and IV antibiotics, s/p L knee bursa aspiration, culture (+) Staph aureus

## 2019-11-02 NOTE — PROGRESS NOTE ADULT - PROBLEM SELECTOR PLAN 3
History of HIV, transmitted from unprotected sex several years ago, no opportunistic infections, last CD4 900s, VLUD  - C/w Biktarvy
History of HLD  - C/w atorvastatin 20 mg QHS (crestor therapeutic exchange).
History of HIV, transmitted from unprotected sex several years ago, no opportunistic infections, last CD4 900s, VLUD  - C/w Biktarvy

## 2019-11-02 NOTE — PROGRESS NOTE ADULT - ASSESSMENT
53yo M with PMHx of HIV (CD4 900s, VL undetectable, on Biktarvy, no hx of opportunistic infections,  HLD, Hep B and Hepatitis C serology (+) but negative viral load, presents with L leg prepatelar swelling, erythema, pain, worsening over 5 days, failed oral outpatient  bactrim and clindamycin , admitted for  further evaluation and IV antibiotics, improving.

## 2019-11-02 NOTE — PROGRESS NOTE ADULT - PROBLEM SELECTOR PLAN 2
History of HIV, transmitted from unprotected sex several years ago, no opportunistic infections, last CD4 900s, VLUD  - C/w Biktarvy.

## 2019-11-02 NOTE — PROGRESS NOTE ADULT - PROBLEM SELECTOR PLAN 5
F: no IVF  E: K>4 Mg>2, monitor and replete as needed  N: regular diet

## 2019-11-02 NOTE — PROGRESS NOTE ADULT - PROBLEM SELECTOR PLAN 1
LLE swelling, erythema, pain, worsening over 5 days, s/p bactrim and clindamycin tx; pt w/ hx of MRSA cellulitis 10 years ago (left lateral proximal thigh) s/p I&D. Given history of MRSA cellulitis and HIV status, concern for MRSA cellulitis  -S/p vancomycin 1g in the ED   -c/w Vancomycin 1500 mg q12h (started on 10/29);   -blood cx NGTD  -bursitis fluid positive for Staph aureus, pending sensitivities  -Tylenol for pain control

## 2019-11-02 NOTE — PROGRESS NOTE ADULT - PROBLEM SELECTOR PLAN 4
History of HLD  - C/w atorvastatin 20 mg QHS (crestor therapeutic exchange)

## 2019-11-02 NOTE — PROGRESS NOTE ADULT - SUBJECTIVE AND OBJECTIVE BOX
INTERVAL HPI/OVERNIGHT EVENTS: s/p L knee bursa aspiration last night, culture (+) Staph aureus today    CONSTITUTIONAL:  Negative fever or chills, feels well, good appetite  EYES:  Negative  blurry vision or double vision  CARDIOVASCULAR:  Negative for chest pain or palpitations  RESPIRATORY:  Negative for cough, wheezing, or SOB   GASTROINTESTINAL:  Negative for nausea, vomiting, diarrhea, constipation, or abdominal pain  GENITOURINARY:  Negative frequency, urgency or dysuria  NEUROLOGIC:  No headache, confusion, dizziness, lightheadedness      ANTIBIOTICS/RELEVANT:    MEDICATIONS  (STANDING):  atorvastatin 20 milliGRAM(s) Oral at bedtime  bictegravir 50 mG/emtricitabine 200 mG/tenofovir alafenamide 25 mG (BIKTARVY) 1 Tablet(s) Oral every 24 hours  pantoprazole    Tablet 40 milliGRAM(s) Oral before breakfast  vancomycin  IVPB 1500 milliGRAM(s) IV Intermittent every 12 hours    MEDICATIONS  (PRN):  acetaminophen   Tablet .. 650 milliGRAM(s) Oral every 6 hours PRN Moderate Pain (4 - 6)        Vital Signs Last 24 Hrs  T(C): 36.9 (02 Nov 2019 20:00), Max: 36.9 (02 Nov 2019 16:58)  T(F): 98.5 (02 Nov 2019 20:00), Max: 98.5 (02 Nov 2019 16:58)  HR: 76 (02 Nov 2019 20:00) (74 - 89)  BP: 130/82 (02 Nov 2019 20:00) (121/85 - 132/84)  BP(mean): --  RR: 16 (02 Nov 2019 20:00) (15 - 16)  SpO2: 98% (02 Nov 2019 20:00) (96% - 98%)    11-01-19 @ 07:01  -  11-02-19 @ 07:00  IN: 1220 mL / OUT: 1650 mL / NET: -430 mL    11-02-19 @ 08:01  -  11-02-19 @ 22:30  IN: 1180 mL / OUT: 0 mL / NET: 1180 mL      PHYSICAL EXAM:  General: WDWN  HEENT: NC/AT; PERRL, anicteric sclera; MMM  Neck: supple  Cardiovascular: +S1/S2; RRR  Respiratory: CTA B/L; no W/R/R  Gastrointestinal: soft, NT/ND; +BSx4  Extremities: WWP, LLE decreased large erythema extending from distal thigh to mid shin, moderate swelling at knee, no crepitus,   Vascular: 2+ radial, DP/PT pulses B/L  Neurological: AAOx3; no focal deficits  LABS:             C-Reactive Protein, Serum (11.02.19 @ 07:42)    C-Reactive Protein, Serum: 3.66 mg/dL    C-Reactive Protein, Serum (10.29.19 @ 17:31)    C-Reactive Protein, Serum: 15.08 mg/dL                 16.0   13.24 )-----------( 413      ( 02 Nov 2019 07:42 )             49.1     11-02    138  |  102  |  21  ----------------------------<  117<H>  4.6   |  27  |  1.27    Ca    9.5      02 Nov 2019 07:42  Mg     2.2     11-02      Vancomycin Level, Trough (11.01.19 @ 21:17)    Vancomycin Level, Trough: 14.1:       MICROBIOLOGY:  Culture - Body Fluid with Gram Stain (11.01.19 @ 17:50)    Gram Stain:   No organisms seen  Few WBC's    Specimen Source: .Body Fluid left knee bursal fluid    Culture Results:   Few Staphylococcus aureus  Susceptibility to follow.    Culture - Blood (10.29.19 @ 20:51)    Specimen Source: .Blood Blood-Peripheral    Culture Results:   No growth at 4 days.      RADIOLOGY & ADDITIONAL STUDIES:   MR Knee w/wo IV Cont, Left (11.01.19 @ 15:07)   Impression: Soft tissue swelling anterior to the patella with   heterogeneous fluid collection in the region of the prepatellar bursa.   Findings are compatible cellulitis and bursitis. Superimposed infection   is suspected.    No evidence of septic arthritis or osteomyelitis.

## 2019-11-02 NOTE — PROGRESS NOTE ADULT - PROBLEM SELECTOR PLAN 2
Orthopedic surgery consulted for concern for septic bursitis/arthritis  -XR of left knee shows soft tissue swelling in prepatellar tissue and joint with full ROM   -MRI LE (+) collection in prepatellar bursa  -s/p aspiration w/ ortho on 11/1  -pt to f/u with Dr. Sadler in office in 10-14 days after discharge

## 2019-11-02 NOTE — PROGRESS NOTE ADULT - PROBLEM SELECTOR PLAN 6
Ppx: Dirk  D: RMF  FULL CODE

## 2019-11-02 NOTE — PROGRESS NOTE ADULT - SUBJECTIVE AND OBJECTIVE BOX
OVERNIGHT EVENTS: VINI    SUBJECTIVE / INTERVAL HPI: Patient seen and examined at bedside. Reports pain is improved, now feels itchiness. Denies Pain in the right leg or other extremities or other joint pain. Denies dizziness, headache, CP, abdominal pain, N/V/F/C.     VITAL SIGNS:  Vital Signs Last 24 Hrs  T(C): 36.9 (02 Nov 2019 20:00), Max: 36.9 (02 Nov 2019 16:58)  T(F): 98.5 (02 Nov 2019 20:00), Max: 98.5 (02 Nov 2019 16:58)  HR: 76 (02 Nov 2019 20:00) (74 - 89)  BP: 130/82 (02 Nov 2019 20:00) (121/85 - 132/84)  BP(mean): --  RR: 16 (02 Nov 2019 20:00) (15 - 16)  SpO2: 98% (02 Nov 2019 20:00) (96% - 98%)    PHYSICAL EXAM:    General: WDWN  HEENT: NC/AT; PERRL, anicteric sclera; MMM  Neck: supple  Cardiovascular: +S1/S2; RRR  Respiratory: CTA B/L; no W/R/R  Gastrointestinal: soft, NT/ND; +BSx4  Extremities: WWP, LLE erythema resolved, moderate swelling at knee, no crepitus,   Vascular: 2+ radial, DP/PT pulses B/L  Neurological: AAOx3; no focal deficits    MEDICATIONS:  MEDICATIONS  (STANDING):  atorvastatin 20 milliGRAM(s) Oral at bedtime  bictegravir 50 mG/emtricitabine 200 mG/tenofovir alafenamide 25 mG (BIKTARVY) 1 Tablet(s) Oral every 24 hours  pantoprazole    Tablet 40 milliGRAM(s) Oral before breakfast  vancomycin  IVPB 1500 milliGRAM(s) IV Intermittent every 12 hours    MEDICATIONS  (PRN):  acetaminophen   Tablet .. 650 milliGRAM(s) Oral every 6 hours PRN Moderate Pain (4 - 6)    ALLERGIES: penicillin    .  LABS:                         16.0   13.24 )-----------( 413      ( 02 Nov 2019 07:42 )             49.1     11-02    138  |  102  |  21  ----------------------------<  117<H>  4.6   |  27  |  1.27    Ca    9.5      02 Nov 2019 07:42  Mg     2.2     11-02    RADIOLOGY, EKG & ADDITIONAL TESTS: Reviewed.

## 2019-11-02 NOTE — PROGRESS NOTE ADULT - PROBLEM SELECTOR PROBLEM 1
Cellulitis of left lower extremity
Septic bursitis
Cellulitis of left lower extremity

## 2019-11-02 NOTE — PROGRESS NOTE ADULT - PROBLEM SELECTOR PROBLEM 3
HIV (human immunodeficiency virus infection)
Hyperlipidemia, unspecified hyperlipidemia type
HIV (human immunodeficiency virus infection)

## 2019-11-03 ENCOUNTER — TRANSCRIPTION ENCOUNTER (OUTPATIENT)
Age: 54
End: 2019-11-03

## 2019-11-03 VITALS
DIASTOLIC BLOOD PRESSURE: 76 MMHG | HEART RATE: 75 BPM | TEMPERATURE: 98 F | SYSTOLIC BLOOD PRESSURE: 125 MMHG | OXYGEN SATURATION: 97 % | RESPIRATION RATE: 16 BRPM

## 2019-11-03 LAB
-  CEFAZOLIN: SIGNIFICANT CHANGE UP
-  CLINDAMYCIN: SIGNIFICANT CHANGE UP
-  ERYTHROMYCIN: SIGNIFICANT CHANGE UP
-  LINEZOLID: SIGNIFICANT CHANGE UP
-  OXACILLIN: SIGNIFICANT CHANGE UP
-  PENICILLIN: SIGNIFICANT CHANGE UP
-  RIFAMPIN: SIGNIFICANT CHANGE UP
-  TRIMETHOPRIM/SULFAMETHOXAZOLE: SIGNIFICANT CHANGE UP
-  VANCOMYCIN: SIGNIFICANT CHANGE UP
ANION GAP SERPL CALC-SCNC: 9 MMOL/L — SIGNIFICANT CHANGE UP (ref 5–17)
BUN SERPL-MCNC: 21 MG/DL — SIGNIFICANT CHANGE UP (ref 7–23)
CALCIUM SERPL-MCNC: 9.2 MG/DL — SIGNIFICANT CHANGE UP (ref 8.4–10.5)
CHLORIDE SERPL-SCNC: 103 MMOL/L — SIGNIFICANT CHANGE UP (ref 96–108)
CO2 SERPL-SCNC: 26 MMOL/L — SIGNIFICANT CHANGE UP (ref 22–31)
CREAT SERPL-MCNC: 1.19 MG/DL — SIGNIFICANT CHANGE UP (ref 0.5–1.3)
CRP SERPL-MCNC: 3.06 MG/DL — HIGH (ref 0–0.4)
CULTURE RESULTS: SIGNIFICANT CHANGE UP
ERYTHROCYTE [SEDIMENTATION RATE] IN BLOOD: 34 MM/HR — HIGH
GLUCOSE SERPL-MCNC: 105 MG/DL — HIGH (ref 70–99)
HCT VFR BLD CALC: 48.5 % — SIGNIFICANT CHANGE UP (ref 39–50)
HGB BLD-MCNC: 15.7 G/DL — SIGNIFICANT CHANGE UP (ref 13–17)
MAGNESIUM SERPL-MCNC: 2.2 MG/DL — SIGNIFICANT CHANGE UP (ref 1.6–2.6)
MCHC RBC-ENTMCNC: 29.8 PG — SIGNIFICANT CHANGE UP (ref 27–34)
MCHC RBC-ENTMCNC: 32.4 GM/DL — SIGNIFICANT CHANGE UP (ref 32–36)
MCV RBC AUTO: 92.2 FL — SIGNIFICANT CHANGE UP (ref 80–100)
METHOD TYPE: SIGNIFICANT CHANGE UP
NRBC # BLD: 0 /100 WBCS — SIGNIFICANT CHANGE UP (ref 0–0)
ORGANISM # SPEC MICROSCOPIC CNT: SIGNIFICANT CHANGE UP
ORGANISM # SPEC MICROSCOPIC CNT: SIGNIFICANT CHANGE UP
PLATELET # BLD AUTO: 388 K/UL — SIGNIFICANT CHANGE UP (ref 150–400)
POTASSIUM SERPL-MCNC: 4.3 MMOL/L — SIGNIFICANT CHANGE UP (ref 3.5–5.3)
POTASSIUM SERPL-SCNC: 4.3 MMOL/L — SIGNIFICANT CHANGE UP (ref 3.5–5.3)
RBC # BLD: 5.26 M/UL — SIGNIFICANT CHANGE UP (ref 4.2–5.8)
RBC # FLD: 13.4 % — SIGNIFICANT CHANGE UP (ref 10.3–14.5)
SODIUM SERPL-SCNC: 138 MMOL/L — SIGNIFICANT CHANGE UP (ref 135–145)
SPECIMEN SOURCE: SIGNIFICANT CHANGE UP
WBC # BLD: 11.32 K/UL — HIGH (ref 3.8–10.5)
WBC # FLD AUTO: 11.32 K/UL — HIGH (ref 3.8–10.5)

## 2019-11-03 PROCEDURE — 73562 X-RAY EXAM OF KNEE 3: CPT

## 2019-11-03 PROCEDURE — 87040 BLOOD CULTURE FOR BACTERIA: CPT

## 2019-11-03 PROCEDURE — 80202 ASSAY OF VANCOMYCIN: CPT

## 2019-11-03 PROCEDURE — 87205 SMEAR GRAM STAIN: CPT

## 2019-11-03 PROCEDURE — 99285 EMERGENCY DEPT VISIT HI MDM: CPT | Mod: 25

## 2019-11-03 PROCEDURE — 87075 CULTR BACTERIA EXCEPT BLOOD: CPT

## 2019-11-03 PROCEDURE — 73723 MRI JOINT LWR EXTR W/O&W/DYE: CPT

## 2019-11-03 PROCEDURE — 36415 COLL VENOUS BLD VENIPUNCTURE: CPT

## 2019-11-03 PROCEDURE — A9585: CPT

## 2019-11-03 PROCEDURE — 85730 THROMBOPLASTIN TIME PARTIAL: CPT

## 2019-11-03 PROCEDURE — 85025 COMPLETE CBC W/AUTO DIFF WBC: CPT

## 2019-11-03 PROCEDURE — 85652 RBC SED RATE AUTOMATED: CPT

## 2019-11-03 PROCEDURE — 83735 ASSAY OF MAGNESIUM: CPT

## 2019-11-03 PROCEDURE — 85610 PROTHROMBIN TIME: CPT

## 2019-11-03 PROCEDURE — 87641 MR-STAPH DNA AMP PROBE: CPT

## 2019-11-03 PROCEDURE — 80048 BASIC METABOLIC PNL TOTAL CA: CPT

## 2019-11-03 PROCEDURE — 96374 THER/PROPH/DIAG INJ IV PUSH: CPT

## 2019-11-03 PROCEDURE — 86140 C-REACTIVE PROTEIN: CPT

## 2019-11-03 PROCEDURE — 85027 COMPLETE CBC AUTOMATED: CPT

## 2019-11-03 PROCEDURE — 87186 SC STD MICRODIL/AGAR DIL: CPT

## 2019-11-03 PROCEDURE — 87070 CULTURE OTHR SPECIMN AEROBIC: CPT

## 2019-11-03 RX ORDER — BICTEGRAVIR SODIUM, EMTRICITABINE, AND TENOFOVIR ALAFENAMIDE FUMARATE 30; 120; 15 MG/1; MG/1; MG/1
1 TABLET ORAL
Qty: 30 | Refills: 0
Start: 2019-11-03 | End: 2019-12-02

## 2019-11-03 RX ORDER — VANCOMYCIN HCL 1 G
1000 VIAL (EA) INTRAVENOUS EVERY 8 HOURS
Refills: 0 | Status: DISCONTINUED | OUTPATIENT
Start: 2019-11-03 | End: 2019-11-03

## 2019-11-03 RX ORDER — AZTREONAM 2 G
1 VIAL (EA) INJECTION
Qty: 20 | Refills: 0
Start: 2019-11-03 | End: 2019-11-12

## 2019-11-03 RX ADMIN — Medication 250 MILLIGRAM(S): at 00:09

## 2019-11-03 RX ADMIN — Medication 250 MILLIGRAM(S): at 07:03

## 2019-11-03 RX ADMIN — BICTEGRAVIR SODIUM, EMTRICITABINE, AND TENOFOVIR ALAFENAMIDE FUMARATE 1 TABLET(S): 30; 120; 15 TABLET ORAL at 07:03

## 2019-11-03 RX ADMIN — PANTOPRAZOLE SODIUM 40 MILLIGRAM(S): 20 TABLET, DELAYED RELEASE ORAL at 07:03

## 2019-11-03 RX ADMIN — Medication 650 MILLIGRAM(S): at 01:51

## 2019-11-03 NOTE — DISCHARGE NOTE PROVIDER - NSDCFUADDAPPT_GEN_ALL_CORE_FT
Please follow up with Dr. Sadler (orthopedics) within 10 days. Please call to make an appointment.     Please follow up with Dr. Barrera within 1-2 weeks.

## 2019-11-03 NOTE — DISCHARGE NOTE PROVIDER - NSDCMRMEDTOKEN_GEN_ALL_CORE_FT
Bactrim  mg-160 mg oral tablet: 1 tab(s) orally every 12 hours   bictegravir/emtricitabine/tenofovir 50 mg-200 mg-25 mg oral tablet: 1 tab(s) orally every 24 hours  Crestor 5 mg oral tablet: 1 tab(s) orally once a day (at bedtime)

## 2019-11-03 NOTE — DISCHARGE NOTE PROVIDER - HOSPITAL COURSE
54M PMHx HIV (CD4 900s, VL undetectable, on Biktarvy, no hx opportunistic infections), HLD, HepB (inactive), presented with leg redness swelling, treated for cellulitis and pre-patellar bursitis.             that started last Friday. Patient could not recall any trauma or exposures to RLE, but noted increased redness and swelling over the course of the first 3 days. This prompted the patient to be evaluated at urgent care, where he was started on Bactrim DS BID. Symptoms worsened, and patient returned to urgent care on Monday and was started on clindamycin TID. Patient notably had blood work showing WBC 16 (now downtrended to 11). Otherwise, patient main complaint has been the LLE pain, which as been constant, radiating up to mid thigh and lower ankle, 8/10 intensity, no known alleviating/exacerbating factors. Patient states 1 day history of night sweats, denies fever, chills, nausea, vomiting, diarrhea. Has no difficulty on ambulation. 54M PMHx HIV (CD4 900s, VL undetectable, on Biktarvy, no hx opportunistic infections), HLD, HepB (inactive), presented with leg redness swelling, treated for cellulitis and pre-patellar bursitis.     Patient with uncomplicated hospital course. Inpatient treated with IV vancomycin, however cultures of fluid from knee grew MSSA. Patient being discharged on Bactrim to complete a total of 2 weeks of antibiotics.         New meds: Bactrim DS BID x 10 days

## 2019-11-03 NOTE — DISCHARGE NOTE PROVIDER - NSDCCPCAREPLAN_GEN_ALL_CORE_FT
PRINCIPAL DISCHARGE DIAGNOSIS  Diagnosis: Cellulitis, unspecified cellulitis site  Assessment and Plan of Treatment: You were diagnosed with cellulitis and bursitis. Cellulitis is an infection of the skin, while bursitis is an infection of the bursa of the knee. We treated you with IV antibiotics. We cultured your knee and it grew a bacteria called staph. We are discharging you on oral antibiotics that treat staph. The medicine is called Bactrim. Please take it twice a day for 10 days. Do not skip doses and do not stop early even if you feel better. Please complete the entire course. Please follow up with Dr. Barrera.      SECONDARY DISCHARGE DIAGNOSES  Diagnosis: Septic bursitis  Assessment and Plan of Treatment: As mentioned above, the bursitis will be treated with the Bactrim as well. Please call the office of Dr. Sadler to make a follow up appointment with him as well (orthopedics).

## 2019-11-03 NOTE — DISCHARGE NOTE PROVIDER - CARE PROVIDER_API CALL
Napoleon Barrera)  Infectious Disease; Internal Medicine  [address], [address]  [place] [address]  Phone: [phone]  Fax: [phone]  Follow Up Time:     Juan David Sadler)  Orthopaedic Surgery  [address] [place] [address]  Phone: [phone]  Fax: [phone]  Follow Up Time:

## 2019-11-06 DIAGNOSIS — Z21 ASYMPTOMATIC HUMAN IMMUNODEFICIENCY VIRUS [HIV] INFECTION STATUS: ICD-10-CM

## 2019-11-06 DIAGNOSIS — M70.42 PREPATELLAR BURSITIS, LEFT KNEE: ICD-10-CM

## 2019-11-06 DIAGNOSIS — B95.61 METHICILLIN SUSCEPTIBLE STAPHYLOCOCCUS AUREUS INFECTION AS THE CAUSE OF DISEASES CLASSIFIED ELSEWHERE: ICD-10-CM

## 2019-11-06 DIAGNOSIS — E78.5 HYPERLIPIDEMIA, UNSPECIFIED: ICD-10-CM

## 2019-11-06 DIAGNOSIS — L03.116 CELLULITIS OF LEFT LOWER LIMB: ICD-10-CM

## 2019-11-06 DIAGNOSIS — Z88.0 ALLERGY STATUS TO PENICILLIN: ICD-10-CM

## 2019-11-06 DIAGNOSIS — B19.10 UNSPECIFIED VIRAL HEPATITIS B WITHOUT HEPATIC COMA: ICD-10-CM

## 2020-03-03 ENCOUNTER — APPOINTMENT (OUTPATIENT)
Dept: ORTHOPEDIC SURGERY | Facility: CLINIC | Age: 55
End: 2020-03-03
Payer: COMMERCIAL

## 2020-03-03 DIAGNOSIS — M54.5 LOW BACK PAIN: ICD-10-CM

## 2020-03-03 DIAGNOSIS — M47.816 SPONDYLOSIS W/OUT MYELOPATHY OR RADICULOPATHY, LUMBAR REGION: ICD-10-CM

## 2020-03-03 DIAGNOSIS — M54.30 SCIATICA, UNSPECIFIED SIDE: ICD-10-CM

## 2020-03-03 PROCEDURE — 99204 OFFICE O/P NEW MOD 45 MIN: CPT

## 2020-03-03 PROCEDURE — 72100 X-RAY EXAM L-S SPINE 2/3 VWS: CPT

## 2020-03-03 PROCEDURE — 73521 X-RAY EXAM HIPS BI 2 VIEWS: CPT | Mod: RT

## 2020-03-03 NOTE — HISTORY OF PRESENT ILLNESS
[de-identified] : Patient reports that the RIGHT buttock has been bothering him for about 2 weeks now, atraumatic. Aching, stiffness, throbbing. Hip is tender to the touch. Pain in back of "hip" upper glute. Pain with using stairs. Pain going from sit to stand. Pain when lying down as well as with standing.

## 2020-03-03 NOTE — PHYSICAL EXAM
[de-identified] : Lumbar back\par \par Constitutional: \par The patient is healthy-appearing and in no apparent distress. \par \par Gait and Station: \par The patient ambulates with a normal gait, no limp. \par \par Cardiovascular System: \par There is bilateral lower extremity capillary refill less than 2 seconds. \par \par Skin: \par There are no skin abnormalities of the lumbar spine.\par \par Lumbar Spine: \par \par Inspection: \par There is no induration, ecchymosis, or swelling. \par \par Bony Palpation: \par There is no tenderness of the spinous processes.\par There is no tenderness of the iliac crest.\par There is no tenderness of either ASIS.\par There is no tenderness of either PSIS\par There is no tenderness of the greater trochanters.\par There is no tenderness of the right SI joint.\par There is no tenderness of the left SI joint. \par \par Soft Tissue Palpation:\par There is tenderness of the paraspinal region at L4/5. \par  \par Active Range of Motion: \par There is normal lateral flexion and rotation. \par \par Motor Strength: \par There is 5/5 hip flexion, knee extension and flexion, ankle dorsiflexion and plantarflexion.\par \par Neurological System: \par There is normal sensation to light touch on bilateral lower extremities.\par There is a negative supine straight leg raising test.\par There is a negative seated straight leg raising test.  \par There is no clonus. \par \par Psychiatric: \par The patient demonstrates a normal mood and affect and is active and alert.\par  [de-identified] : X-ray right hip.  There is no significant bony / soft tissue abnormality, arthritis, or fracture.\par \par X-ray lumbar spine. There is no significant bony / soft tissue abnormality or fracture.  There is loss of lordosis as well as L5-S1 arthritis with diffuse lumbar foraminal stenosis

## 2020-03-03 NOTE — ASSESSMENT
[FreeTextEntry1] : Discussed at length with patient exam history and imaging.  If no improvement with physical therapy consideration MRI lumbar spine for possible epidural steroid injection and/or surgery

## 2020-03-05 DIAGNOSIS — M89.8X1 OTHER SPECIFIED DISORDERS OF BONE, SHOULDER: ICD-10-CM

## 2020-09-22 ENCOUNTER — APPOINTMENT (OUTPATIENT)
Dept: OTOLARYNGOLOGY | Facility: CLINIC | Age: 55
End: 2020-09-22
Payer: COMMERCIAL

## 2020-09-22 VITALS
DIASTOLIC BLOOD PRESSURE: 91 MMHG | HEART RATE: 96 BPM | TEMPERATURE: 98.3 F | SYSTOLIC BLOOD PRESSURE: 156 MMHG | OXYGEN SATURATION: 97 %

## 2020-09-22 DIAGNOSIS — H92.01 OTALGIA, RIGHT EAR: ICD-10-CM

## 2020-09-22 DIAGNOSIS — H66.91 OTITIS MEDIA, UNSPECIFIED, RIGHT EAR: ICD-10-CM

## 2020-09-22 PROCEDURE — 92550 TYMPANOMETRY & REFLEX THRESH: CPT

## 2020-09-22 PROCEDURE — 92557 COMPREHENSIVE HEARING TEST: CPT

## 2020-09-22 PROCEDURE — 99214 OFFICE O/P EST MOD 30 MIN: CPT | Mod: 25

## 2020-09-22 PROCEDURE — 31231 NASAL ENDOSCOPY DX: CPT

## 2020-09-22 RX ORDER — BICTEGRAVIR SODIUM, EMTRICITABINE, AND TENOFOVIR ALAFENAMIDE FUMARATE 50; 200; 25 MG/1; MG/1; MG/1
50-200-25 TABLET ORAL
Qty: 30 | Refills: 0 | Status: ACTIVE | COMMUNITY
Start: 2020-08-25

## 2020-09-22 RX ORDER — AZITHROMYCIN 250 MG/1
250 TABLET, FILM COATED ORAL
Qty: 6 | Refills: 0 | Status: ACTIVE | COMMUNITY
Start: 2020-04-20

## 2020-09-22 RX ORDER — NYSTATIN 100000 [USP'U]/G
100000 CREAM TOPICAL
Qty: 30 | Refills: 0 | Status: ACTIVE | COMMUNITY
Start: 2020-04-20

## 2020-09-22 RX ORDER — DOXYCYCLINE HYCLATE 100 MG/1
100 CAPSULE ORAL
Qty: 28 | Refills: 0 | Status: ACTIVE | COMMUNITY
Start: 2020-05-16

## 2020-09-22 RX ORDER — TRAZODONE HYDROCHLORIDE 100 MG/1
100 TABLET ORAL
Qty: 30 | Refills: 0 | Status: ACTIVE | COMMUNITY
Start: 2020-08-18

## 2020-09-22 RX ORDER — METHYLPREDNISOLONE 4 MG/1
4 TABLET ORAL
Qty: 1 | Refills: 0 | Status: ACTIVE | COMMUNITY
Start: 2020-09-22 | End: 1900-01-01

## 2020-09-22 RX ORDER — LEVOFLOXACIN 750 MG/1
750 TABLET, FILM COATED ORAL
Qty: 7 | Refills: 0 | Status: ACTIVE | COMMUNITY
Start: 2020-05-11

## 2020-09-22 NOTE — HISTORY OF PRESENT ILLNESS
[de-identified] : 55 years old male patient with history of Persistent right ear pain and ear pressure for the past 9 days .

## 2020-09-22 NOTE — REASON FOR VISIT
[Subsequent Evaluation] : a subsequent evaluation for [FreeTextEntry2] : Persistent right ear pain and ear pressure for the past 9 days .  Patient states his level of severity is a level 10 out of 10 and it occurs constant.  Patient states nothing  helps to improve or worsens his Persistent right ear pain and ear pressure for the past 9 days .

## 2020-09-29 LAB — BACTERIA FLD CULT: ABNORMAL

## 2020-10-01 ENCOUNTER — APPOINTMENT (OUTPATIENT)
Dept: OTOLARYNGOLOGY | Facility: CLINIC | Age: 55
End: 2020-10-01
Payer: COMMERCIAL

## 2020-10-01 PROCEDURE — 92557 COMPREHENSIVE HEARING TEST: CPT

## 2020-10-01 PROCEDURE — 99213 OFFICE O/P EST LOW 20 MIN: CPT

## 2020-10-01 PROCEDURE — 92550 TYMPANOMETRY & REFLEX THRESH: CPT

## 2020-10-01 NOTE — REASON FOR VISIT
[Subsequent Evaluation] : a subsequent evaluation for [FreeTextEntry2] : rt CORBY improved w steroids nose open, Hearing better and Tymp better

## 2020-11-06 ENCOUNTER — APPOINTMENT (OUTPATIENT)
Dept: OTOLARYNGOLOGY | Facility: CLINIC | Age: 55
End: 2020-11-06
Payer: COMMERCIAL

## 2020-11-06 VITALS
OXYGEN SATURATION: 98 % | DIASTOLIC BLOOD PRESSURE: 89 MMHG | SYSTOLIC BLOOD PRESSURE: 147 MMHG | TEMPERATURE: 98.1 F | HEART RATE: 91 BPM

## 2020-11-06 PROCEDURE — 99214 OFFICE O/P EST MOD 30 MIN: CPT

## 2020-11-06 PROCEDURE — 99072 ADDL SUPL MATRL&STAF TM PHE: CPT

## 2020-11-06 RX ORDER — SULFAMETHOXAZOLE AND TRIMETHOPRIM 800; 160 MG/1; MG/1
800-160 TABLET ORAL
Qty: 20 | Refills: 0 | Status: ACTIVE | COMMUNITY
Start: 2020-11-06 | End: 1900-01-01

## 2020-11-06 NOTE — HISTORY OF PRESENT ILLNESS
[de-identified] : 56 yo man with hiv and recurrent sinusitis. Seen for Dr Saleh. He reports a 3-4 d h/o nasal congestion, drainage crusting, mild sob thru nose. -covid exposures. -f.s.c or respiratory issues. nonsmoker. describes profuse yellow drainage, pen allergic. went to urgent care and given erythromycin with no benefit at all. He said this is in the spectrum of a usual sinus infx for him

## 2020-12-23 PROBLEM — H66.91 OTITIS MEDIA, RIGHT: Status: RESOLVED | Noted: 2020-09-22 | Resolved: 2020-12-23

## 2021-02-04 ENCOUNTER — APPOINTMENT (OUTPATIENT)
Dept: INTERNAL MEDICINE | Facility: CLINIC | Age: 56
End: 2021-02-04
Payer: COMMERCIAL

## 2021-02-04 VITALS
DIASTOLIC BLOOD PRESSURE: 99 MMHG | HEART RATE: 84 BPM | OXYGEN SATURATION: 97 % | SYSTOLIC BLOOD PRESSURE: 164 MMHG | TEMPERATURE: 97.1 F | BODY MASS INDEX: 24.34 KG/M2 | HEIGHT: 70 IN | WEIGHT: 170 LBS

## 2021-02-04 DIAGNOSIS — K21.9 GASTRO-ESOPHAGEAL REFLUX DISEASE W/OUT ESOPHAGITIS: ICD-10-CM

## 2021-02-04 DIAGNOSIS — B20 HUMAN IMMUNODEFICIENCY VIRUS [HIV] DISEASE: ICD-10-CM

## 2021-02-04 DIAGNOSIS — Z00.00 ENCOUNTER FOR GENERAL ADULT MEDICAL EXAMINATION W/OUT ABNORMAL FINDINGS: ICD-10-CM

## 2021-02-04 DIAGNOSIS — U07.1 COVID-19: ICD-10-CM

## 2021-02-04 PROCEDURE — 99072 ADDL SUPL MATRL&STAF TM PHE: CPT

## 2021-02-04 PROCEDURE — 99386 PREV VISIT NEW AGE 40-64: CPT | Mod: 25

## 2021-02-04 PROCEDURE — 36415 COLL VENOUS BLD VENIPUNCTURE: CPT

## 2021-02-04 RX ORDER — NABUMETONE 500 MG/1
500 TABLET, FILM COATED ORAL
Qty: 60 | Refills: 2 | Status: DISCONTINUED | COMMUNITY
Start: 2020-03-03 | End: 2021-02-04

## 2021-02-04 RX ORDER — DOLUTEGRAVIR SODIUM 50 MG/1
50 TABLET, FILM COATED ORAL
Qty: 30 | Refills: 0 | Status: DISCONTINUED | COMMUNITY
Start: 2018-08-27 | End: 2021-02-04

## 2021-02-04 RX ORDER — NEOMYCIN SULFATE, POLYMYXIN B SULFATE AND HYDROCORTISONE 3.5; 10000; 1 MG/ML; [IU]/ML; MG/ML
3.5-10000-1 SOLUTION AURICULAR (OTIC)
Qty: 10 | Refills: 0 | Status: DISCONTINUED | COMMUNITY
Start: 2020-09-17 | End: 2021-02-04

## 2021-02-04 RX ORDER — OMEPRAZOLE 20 MG/1
20 CAPSULE, DELAYED RELEASE ORAL
Qty: 30 | Refills: 0 | Status: DISCONTINUED | COMMUNITY
Start: 2020-08-18 | End: 2021-02-04

## 2021-02-04 RX ORDER — METAXALONE 800 MG/1
800 TABLET ORAL 3 TIMES DAILY
Qty: 30 | Refills: 0 | Status: DISCONTINUED | COMMUNITY
Start: 2020-03-03 | End: 2021-02-04

## 2021-02-04 RX ORDER — PANTOPRAZOLE 40 MG/1
40 TABLET, DELAYED RELEASE ORAL
Qty: 28 | Refills: 0 | Status: DISCONTINUED | COMMUNITY
Start: 2020-09-17 | End: 2021-02-04

## 2021-02-04 RX ORDER — TRIAMCINOLONE ACETONIDE 1 MG/G
0.1 CREAM TOPICAL
Qty: 30 | Refills: 0 | Status: DISCONTINUED | COMMUNITY
Start: 2020-04-20 | End: 2021-02-04

## 2021-02-04 RX ORDER — SULFAMETHOXAZOLE AND TRIMETHOPRIM 800; 160 MG/1; MG/1
800-160 TABLET ORAL TWICE DAILY
Qty: 14 | Refills: 0 | Status: DISCONTINUED | COMMUNITY
Start: 2020-09-22 | End: 2021-02-04

## 2021-02-04 RX ORDER — EMTRICITABINE AND TENOFOVIR DISOPROXIL FUMARATE 200; 300 MG/1; MG/1
200-300 TABLET, FILM COATED ORAL
Qty: 30 | Refills: 0 | Status: DISCONTINUED | COMMUNITY
Start: 2018-08-27 | End: 2021-02-04

## 2021-02-04 NOTE — ASSESSMENT
[FreeTextEntry1] : Patient has a stable examination; \par  He had a positive Covid antibody test.  Was ill last March and April; His antibody levels at that time were not very high Will repeat today\par Will get labs from the Dr Hernadez;  She manages his HIV

## 2021-02-04 NOTE — PHYSICAL EXAM
[No Acute Distress] : no acute distress [Well Nourished] : well nourished [Well Developed] : well developed [Well-Appearing] : well-appearing [Normal Sclera/Conjunctiva] : normal sclera/conjunctiva [PERRL] : pupils equal round and reactive to light [EOMI] : extraocular movements intact [Normal Outer Ear/Nose] : the outer ears and nose were normal in appearance [Normal Oropharynx] : the oropharynx was normal [No JVD] : no jugular venous distention [No Lymphadenopathy] : no lymphadenopathy [Supple] : supple [Thyroid Normal, No Nodules] : the thyroid was normal and there were no nodules present [No Respiratory Distress] : no respiratory distress  [No Accessory Muscle Use] : no accessory muscle use [Clear to Auscultation] : lungs were clear to auscultation bilaterally [Normal Rate] : normal rate  [Regular Rhythm] : with a regular rhythm [Normal S1, S2] : normal S1 and S2 [No Murmur] : no murmur heard [No Carotid Bruits] : no carotid bruits [No Abdominal Bruit] : a ~M bruit was not heard ~T in the abdomen [No Varicosities] : no varicosities [Pedal Pulses Present] : the pedal pulses are present [No Edema] : there was no peripheral edema [No Palpable Aorta] : no palpable aorta [No Extremity Clubbing/Cyanosis] : no extremity clubbing/cyanosis [Soft] : abdomen soft [Non Tender] : non-tender [Non-distended] : non-distended [No Masses] : no abdominal mass palpated [No HSM] : no HSM [Normal Bowel Sounds] : normal bowel sounds [Normal Posterior Cervical Nodes] : no posterior cervical lymphadenopathy [Normal Anterior Cervical Nodes] : no anterior cervical lymphadenopathy [No CVA Tenderness] : no CVA  tenderness [No Spinal Tenderness] : no spinal tenderness [No Joint Swelling] : no joint swelling [Grossly Normal Strength/Tone] : grossly normal strength/tone [No Rash] : no rash [Coordination Grossly Intact] : coordination grossly intact [No Focal Deficits] : no focal deficits [Normal Gait] : normal gait [Deep Tendon Reflexes (DTR)] : deep tendon reflexes were 2+ and symmetric [Normal Affect] : the affect was normal [Normal Insight/Judgement] : insight and judgment were intact [FreeTextEntry1] : normal prostate

## 2021-02-04 NOTE — HISTORY OF PRESENT ILLNESS
[FreeTextEntry1] : Medical history reviewed;  [de-identified] : Followed by ID; \par Viral load detectable; \par Cologuard  negative\par exercise same\par Family history of heart disease, Diabetes

## 2021-02-04 NOTE — HEALTH RISK ASSESSMENT
[No] : No [No falls in past year] : Patient reported no falls in the past year [0] : 2) Feeling down, depressed, or hopeless: Not at all (0) [HIV test declined] : HIV test declined [Hepatitis C test declined] : Hepatitis C test declined [[id number]] : 0 [ColonoscopyDate] : 02/2021 [ColonoscopyComments] : Poor prep

## 2021-02-05 LAB
SARS-COV-2 IGG SERPL IA-ACNC: 6.89 AU/ML
SARS-COV-2 IGG SERPL QL IA: NEGATIVE

## 2021-02-07 ENCOUNTER — TRANSCRIPTION ENCOUNTER (OUTPATIENT)
Age: 56
End: 2021-02-07

## 2021-02-25 NOTE — ED PROVIDER NOTE - NS ED MD EM SELECTION
Niacin (vitamin B3) over the counter is notorious for causing liver problems, If 100mg or less would like be ok;  If 5000 IU this should be a small miligram amount   [address] [facility]

## 2021-04-21 ENCOUNTER — APPOINTMENT (OUTPATIENT)
Dept: INTERNAL MEDICINE | Facility: CLINIC | Age: 56
End: 2021-04-21
Payer: COMMERCIAL

## 2021-04-21 VITALS
HEART RATE: 80 BPM | BODY MASS INDEX: 25.05 KG/M2 | SYSTOLIC BLOOD PRESSURE: 146 MMHG | HEIGHT: 70 IN | DIASTOLIC BLOOD PRESSURE: 94 MMHG | OXYGEN SATURATION: 98 % | TEMPERATURE: 97.8 F | WEIGHT: 175 LBS

## 2021-04-21 DIAGNOSIS — M79.18 MYALGIA, OTHER SITE: ICD-10-CM

## 2021-04-21 PROCEDURE — 99072 ADDL SUPL MATRL&STAF TM PHE: CPT

## 2021-04-21 PROCEDURE — 99213 OFFICE O/P EST LOW 20 MIN: CPT

## 2021-04-21 NOTE — ASSESSMENT
[FreeTextEntry1] : Not clear etiology of pain\par Will refer to [facility] for evaluation

## 2021-04-21 NOTE — HISTORY OF PRESENT ILLNESS
[FreeTextEntry8] : Patient with chief complaint pain in  buttock area; Hurts when sitting;\par Has had anal intercourse recently \par No problems with bowel movements

## 2021-07-22 ENCOUNTER — EMERGENCY (EMERGENCY)
Facility: HOSPITAL | Age: 56
LOS: 1 days | Discharge: HOME | End: 2021-07-22
Attending: EMERGENCY MEDICINE | Admitting: EMERGENCY MEDICINE
Payer: COMMERCIAL

## 2021-07-22 VITALS
SYSTOLIC BLOOD PRESSURE: 144 MMHG | WEIGHT: 169.98 LBS | OXYGEN SATURATION: 99 % | TEMPERATURE: 98 F | HEIGHT: 70 IN | DIASTOLIC BLOOD PRESSURE: 84 MMHG | HEART RATE: 94 BPM | RESPIRATION RATE: 22 BRPM

## 2021-07-22 VITALS
HEART RATE: 70 BPM | SYSTOLIC BLOOD PRESSURE: 157 MMHG | DIASTOLIC BLOOD PRESSURE: 90 MMHG | OXYGEN SATURATION: 99 % | TEMPERATURE: 99 F | RESPIRATION RATE: 16 BRPM

## 2021-07-22 DIAGNOSIS — Z96.619 PRESENCE OF UNSPECIFIED ARTIFICIAL SHOULDER JOINT: Chronic | ICD-10-CM

## 2021-07-22 DIAGNOSIS — Z21 ASYMPTOMATIC HUMAN IMMUNODEFICIENCY VIRUS [HIV] INFECTION STATUS: ICD-10-CM

## 2021-07-22 DIAGNOSIS — Z88.0 ALLERGY STATUS TO PENICILLIN: ICD-10-CM

## 2021-07-22 DIAGNOSIS — R10.31 RIGHT LOWER QUADRANT PAIN: ICD-10-CM

## 2021-07-22 DIAGNOSIS — N45.1 EPIDIDYMITIS: ICD-10-CM

## 2021-07-22 LAB
ALBUMIN SERPL ELPH-MCNC: 4 G/DL — SIGNIFICANT CHANGE UP (ref 3.3–5)
ALP SERPL-CCNC: 79 U/L — SIGNIFICANT CHANGE UP (ref 40–120)
ALT FLD-CCNC: 18 U/L — SIGNIFICANT CHANGE UP (ref 10–45)
ANION GAP SERPL CALC-SCNC: 9 MMOL/L — SIGNIFICANT CHANGE UP (ref 5–17)
APPEARANCE UR: ABNORMAL
AST SERPL-CCNC: 22 U/L — SIGNIFICANT CHANGE UP (ref 10–40)
BACTERIA # UR AUTO: SIGNIFICANT CHANGE UP /HPF
BASOPHILS # BLD AUTO: 0.04 K/UL — SIGNIFICANT CHANGE UP (ref 0–0.2)
BASOPHILS NFR BLD AUTO: 0.3 % — SIGNIFICANT CHANGE UP (ref 0–2)
BILIRUB SERPL-MCNC: 0.4 MG/DL — SIGNIFICANT CHANGE UP (ref 0.2–1.2)
BILIRUB UR-MCNC: NEGATIVE — SIGNIFICANT CHANGE UP
BUN SERPL-MCNC: 21 MG/DL — SIGNIFICANT CHANGE UP (ref 7–23)
CALCIUM SERPL-MCNC: 9.6 MG/DL — SIGNIFICANT CHANGE UP (ref 8.4–10.5)
CHLORIDE SERPL-SCNC: 102 MMOL/L — SIGNIFICANT CHANGE UP (ref 96–108)
CO2 SERPL-SCNC: 26 MMOL/L — SIGNIFICANT CHANGE UP (ref 22–31)
COLOR SPEC: YELLOW — SIGNIFICANT CHANGE UP
COMMENT - URINE: SIGNIFICANT CHANGE UP
CREAT SERPL-MCNC: 1.15 MG/DL — SIGNIFICANT CHANGE UP (ref 0.5–1.3)
DIFF PNL FLD: ABNORMAL
EOSINOPHIL # BLD AUTO: 0.2 K/UL — SIGNIFICANT CHANGE UP (ref 0–0.5)
EOSINOPHIL NFR BLD AUTO: 1.6 % — SIGNIFICANT CHANGE UP (ref 0–6)
EPI CELLS # UR: SIGNIFICANT CHANGE UP /HPF (ref 0–5)
GLUCOSE SERPL-MCNC: 96 MG/DL — SIGNIFICANT CHANGE UP (ref 70–99)
GLUCOSE UR QL: NEGATIVE — SIGNIFICANT CHANGE UP
HCT VFR BLD CALC: 41.7 % — SIGNIFICANT CHANGE UP (ref 39–50)
HGB BLD-MCNC: 13.5 G/DL — SIGNIFICANT CHANGE UP (ref 13–17)
IMM GRANULOCYTES NFR BLD AUTO: 0.2 % — SIGNIFICANT CHANGE UP (ref 0–1.5)
KETONES UR-MCNC: NEGATIVE — SIGNIFICANT CHANGE UP
LEUKOCYTE ESTERASE UR-ACNC: ABNORMAL
LIDOCAIN IGE QN: 27 U/L — SIGNIFICANT CHANGE UP (ref 7–60)
LYMPHOCYTES # BLD AUTO: 26.9 % — SIGNIFICANT CHANGE UP (ref 13–44)
LYMPHOCYTES # BLD AUTO: 3.35 K/UL — HIGH (ref 1–3.3)
MCHC RBC-ENTMCNC: 30.1 PG — SIGNIFICANT CHANGE UP (ref 27–34)
MCHC RBC-ENTMCNC: 32.4 GM/DL — SIGNIFICANT CHANGE UP (ref 32–36)
MCV RBC AUTO: 93.1 FL — SIGNIFICANT CHANGE UP (ref 80–100)
MONOCYTES # BLD AUTO: 1.07 K/UL — HIGH (ref 0–0.9)
MONOCYTES NFR BLD AUTO: 8.6 % — SIGNIFICANT CHANGE UP (ref 2–14)
NEUTROPHILS # BLD AUTO: 7.78 K/UL — HIGH (ref 1.8–7.4)
NEUTROPHILS NFR BLD AUTO: 62.4 % — SIGNIFICANT CHANGE UP (ref 43–77)
NITRITE UR-MCNC: NEGATIVE — SIGNIFICANT CHANGE UP
NRBC # BLD: 0 /100 WBCS — SIGNIFICANT CHANGE UP (ref 0–0)
PH UR: 7 — SIGNIFICANT CHANGE UP (ref 5–8)
PLATELET # BLD AUTO: 296 K/UL — SIGNIFICANT CHANGE UP (ref 150–400)
POTASSIUM SERPL-MCNC: 4.4 MMOL/L — SIGNIFICANT CHANGE UP (ref 3.5–5.3)
POTASSIUM SERPL-SCNC: 4.4 MMOL/L — SIGNIFICANT CHANGE UP (ref 3.5–5.3)
PROT SERPL-MCNC: 6.9 G/DL — SIGNIFICANT CHANGE UP (ref 6–8.3)
PROT UR-MCNC: ABNORMAL MG/DL
RBC # BLD: 4.48 M/UL — SIGNIFICANT CHANGE UP (ref 4.2–5.8)
RBC # FLD: 14 % — SIGNIFICANT CHANGE UP (ref 10.3–14.5)
RBC CASTS # UR COMP ASSIST: ABNORMAL /HPF
SODIUM SERPL-SCNC: 137 MMOL/L — SIGNIFICANT CHANGE UP (ref 135–145)
SP GR SPEC: 1.01 — SIGNIFICANT CHANGE UP (ref 1–1.03)
UROBILINOGEN FLD QL: 0.2 E.U./DL — SIGNIFICANT CHANGE UP
WBC # BLD: 12.47 K/UL — HIGH (ref 3.8–10.5)
WBC # FLD AUTO: 12.47 K/UL — HIGH (ref 3.8–10.5)
WBC UR QL: ABNORMAL /HPF

## 2021-07-22 PROCEDURE — 74176 CT ABD & PELVIS W/O CONTRAST: CPT | Mod: MG

## 2021-07-22 PROCEDURE — G1004: CPT

## 2021-07-22 PROCEDURE — 85025 COMPLETE CBC W/AUTO DIFF WBC: CPT

## 2021-07-22 PROCEDURE — 96365 THER/PROPH/DIAG IV INF INIT: CPT

## 2021-07-22 PROCEDURE — 80053 COMPREHEN METABOLIC PANEL: CPT

## 2021-07-22 PROCEDURE — 76870 US EXAM SCROTUM: CPT

## 2021-07-22 PROCEDURE — 83690 ASSAY OF LIPASE: CPT

## 2021-07-22 PROCEDURE — 87086 URINE CULTURE/COLONY COUNT: CPT

## 2021-07-22 PROCEDURE — 99284 EMERGENCY DEPT VISIT MOD MDM: CPT | Mod: 25

## 2021-07-22 PROCEDURE — 96375 TX/PRO/DX INJ NEW DRUG ADDON: CPT

## 2021-07-22 PROCEDURE — 99285 EMERGENCY DEPT VISIT HI MDM: CPT

## 2021-07-22 PROCEDURE — 74176 CT ABD & PELVIS W/O CONTRAST: CPT | Mod: 26,MG

## 2021-07-22 PROCEDURE — 76870 US EXAM SCROTUM: CPT | Mod: 26

## 2021-07-22 PROCEDURE — 81001 URINALYSIS AUTO W/SCOPE: CPT

## 2021-07-22 PROCEDURE — 36415 COLL VENOUS BLD VENIPUNCTURE: CPT

## 2021-07-22 RX ORDER — MORPHINE SULFATE 50 MG/1
4 CAPSULE, EXTENDED RELEASE ORAL ONCE
Refills: 0 | Status: DISCONTINUED | OUTPATIENT
Start: 2021-07-22 | End: 2021-07-22

## 2021-07-22 RX ORDER — OXYCODONE AND ACETAMINOPHEN 5; 325 MG/1; MG/1
1 TABLET ORAL ONCE
Refills: 0 | Status: DISCONTINUED | OUTPATIENT
Start: 2021-07-22 | End: 2021-07-22

## 2021-07-22 RX ORDER — CEFTRIAXONE 500 MG/1
1000 INJECTION, POWDER, FOR SOLUTION INTRAMUSCULAR; INTRAVENOUS ONCE
Refills: 0 | Status: COMPLETED | OUTPATIENT
Start: 2021-07-22 | End: 2021-07-22

## 2021-07-22 RX ORDER — SODIUM CHLORIDE 9 MG/ML
1000 INJECTION INTRAMUSCULAR; INTRAVENOUS; SUBCUTANEOUS ONCE
Refills: 0 | Status: COMPLETED | OUTPATIENT
Start: 2021-07-22 | End: 2021-07-22

## 2021-07-22 RX ORDER — IBUPROFEN 200 MG
1 TABLET ORAL
Qty: 30 | Refills: 0
Start: 2021-07-22

## 2021-07-22 RX ORDER — KETOROLAC TROMETHAMINE 30 MG/ML
15 SYRINGE (ML) INJECTION ONCE
Refills: 0 | Status: DISCONTINUED | OUTPATIENT
Start: 2021-07-22 | End: 2021-07-22

## 2021-07-22 RX ORDER — LIDOCAINE HCL 20 MG/ML
120 VIAL (ML) INJECTION ONCE
Refills: 0 | Status: DISCONTINUED | OUTPATIENT
Start: 2021-07-22 | End: 2021-07-22

## 2021-07-22 RX ADMIN — CEFTRIAXONE 100 MILLIGRAM(S): 500 INJECTION, POWDER, FOR SOLUTION INTRAMUSCULAR; INTRAVENOUS at 16:50

## 2021-07-22 RX ADMIN — SODIUM CHLORIDE 1000 MILLILITER(S): 9 INJECTION INTRAMUSCULAR; INTRAVENOUS; SUBCUTANEOUS at 11:54

## 2021-07-22 RX ADMIN — OXYCODONE AND ACETAMINOPHEN 1 TABLET(S): 5; 325 TABLET ORAL at 16:50

## 2021-07-22 RX ADMIN — MORPHINE SULFATE 4 MILLIGRAM(S): 50 CAPSULE, EXTENDED RELEASE ORAL at 14:29

## 2021-07-22 RX ADMIN — MORPHINE SULFATE 4 MILLIGRAM(S): 50 CAPSULE, EXTENDED RELEASE ORAL at 11:54

## 2021-07-22 RX ADMIN — CEFTRIAXONE 1000 MILLIGRAM(S): 500 INJECTION, POWDER, FOR SOLUTION INTRAMUSCULAR; INTRAVENOUS at 17:54

## 2021-07-22 RX ADMIN — Medication 15 MILLIGRAM(S): at 14:29

## 2021-07-22 RX ADMIN — OXYCODONE AND ACETAMINOPHEN 1 TABLET(S): 5; 325 TABLET ORAL at 14:08

## 2021-07-22 RX ADMIN — SODIUM CHLORIDE 1000 MILLILITER(S): 9 INJECTION INTRAMUSCULAR; INTRAVENOUS; SUBCUTANEOUS at 13:38

## 2021-07-22 RX ADMIN — Medication 100 MILLIGRAM(S): at 17:54

## 2021-07-22 RX ADMIN — OXYCODONE AND ACETAMINOPHEN 1 TABLET(S): 5; 325 TABLET ORAL at 14:29

## 2021-07-22 RX ADMIN — Medication 15 MILLIGRAM(S): at 13:38

## 2021-07-22 NOTE — ED PROVIDER NOTE - NSFOLLOWUPINSTRUCTIONS_ED_ALL_ED_FT
Epididymitis    WHAT YOU NEED TO KNOW:    Epididymitis is inflammation of your epididymis. The epididymis is a coiled tube inside your scrotum. It stores and carries sperm from your testicles to your penis. Acute epididymitis lasts for 6 weeks or less. Chronic epididymitis lasts longer than 6 weeks.    Male Reproductive System         DISCHARGE INSTRUCTIONS:    Return to the emergency department if:   •You have severe pain in your testicles.      •Your symptoms become worse even after you start treatment with medicine.      Contact your healthcare provider if:   •Your symptoms do not get better within 3 days of treatment or come back after treatment.      •You have a hot, red, tender area on your testicles.      •You have questions or concerns about your condition or care.      Medicines:   •Antibiotics may be given if epididymitis is caused by a bacterial infection.       •NSAIDs, such as ibuprofen, help decrease swelling, pain, and fever. This medicine is available with or without a doctor's order. NSAIDs can cause stomach bleeding or kidney problems in certain people. If you take blood thinner medicine, always ask if NSAIDs are safe for you. Always read the medicine label and follow directions. Do not give these medicines to children under 6 months of age without direction from your child's healthcare provider.      •Acetaminophen decreases pain and fever. It is available without a doctor's order. Ask how much to take and how often to take it. Follow directions. Acetaminophen can cause liver damage if not taken correctly.      •Prescription pain medicine may be given. Ask how to take this medicine safely.      •Take your medicine as directed. Contact your healthcare provider if you think your medicine is not helping or if you have side effects. Tell him of her if you are allergic to any medicine. Keep a list of the medicines, vitamins, and herbs you take. Include the amounts, and when and why you take them. Bring the list or the pill bottles to follow-up visits. Carry your medicine list with you in case of an emergency.      Self-care:   •Apply ice on your testicles for 15 to 20 minutes every hour or as directed. Use an ice pack, or put crushed ice in a plastic bag. Cover it with a towel. Ice helps prevent tissue damage and decreases swelling and pain.      •Rest in bed as directed. Elevate your scrotum when you sit or lie down to help reduce swelling and pain. You may be asked to do this by placing a rolled-up towel under your scrotum.      •Scrotal support may be recommended. An athletic supporter provides scrotal support and may make you more comfortable when you stand. Ask your healthcare provider how to use an athletic supporter.       •Do not lift heavy objects. You can make swelling worse if you lift heavy objects or strain.       Follow up with your healthcare provider as directed: Write down your questions so you remember to ask them during your visits.

## 2021-07-22 NOTE — ED PROVIDER NOTE - PATIENT PORTAL LINK FT
You can access the FollowMyHealth Patient Portal offered by [facility] by registering at the following website: [email or web address]. By joining [employer]’s FollowMyHealth portal, you will also be able to view your health information using other applications (apps) compatible with our system.

## 2021-07-22 NOTE — ED ADULT NURSE NOTE - OBJECTIVE STATEMENT
Pt c/o scrotal swelling and pain. pt states he has dysuria yesterday and now is having swelling and pain.

## 2021-07-22 NOTE — ED PROVIDER NOTE - GENITOURINARY, MLM
penis with no lesions, +mild right testicular swelling and tenderness to palpation, no erythema or rash

## 2021-07-22 NOTE — ED ADULT TRIAGE NOTE - CHIEF COMPLAINT QUOTE
Pt presents reporting severe right lower quadrant pain starting yesterday worsening this am, pt reports difficulty maintaining urine stream, states "yesterday some crud came out with my urine." Pt reports inguinal hernia repair 20 years ago. Unable to sit still d/t pain.

## 2021-07-22 NOTE — ED PROVIDER NOTE - CARE PROVIDER_API CALL
Keshawn Arroyo  Urology  [address], [facility] [place] [address]  Phone: [phone]  Fax: [phone]  Follow Up Time:

## 2021-07-22 NOTE — ED PROVIDER NOTE - NSFOLLOWUPCLINICS_GEN_ALL_ED_FT
[facility]  Urology  [address], [address]  [place], [place] [address]  Phone: [phone]  Fax:

## 2021-07-22 NOTE — ED PROVIDER NOTE - OBJECTIVE STATEMENT
57 y/o m hx HIV presents c/o right lower abd pain radiating to right groin, right testicular pain since last night.  Pt stating the pain worsened this morning, feels right testicle slightly swollen.  Pt stating when he urinated yesterday, there was "crud" he noted in it which appeared similar to dirt or sediment.  Pt reports nausea, no vomiting, hasn't taken anything for his pain.  Denies fever, chills, diarrhea, CP, SOB, all other ROS negative.

## 2021-07-22 NOTE — ED PROVIDER NOTE - PROGRESS NOTE DETAILS
u/s shows no torsion, has b/l epididymitis with evidence on u/s of cellulitis.  on exam, pt with no edema or erythema as would be expected with cellulitis, noted to have mild leukocytosis.  CT a/p neg for ureteral stone.  Pt currently sexually active with multiple partners, possible STI risk factors.  Pt received ceftriaxone, will also give doxycycline course which should provide coverage for urine and skin.  Pt will f/u with urology, advised to return to ED if sx worsen.

## 2021-07-22 NOTE — ED PROVIDER NOTE - ATTENDING CONTRIBUTION TO CARE
pt w ho o f HIV  NEW RLQ abd pain, associated nausea, pt reports saw "sediment " in his urine yesterday   mild RLQ tender, rt testicle is slightly swollen, tender, nl lie , + epidydimal tendernss  consider torsion, consider epidymitis , orchitis, consider less likely renal stone,   PLAN: initial < L, UA, scrotal U/S,   dispo pending workup / eval

## 2021-07-24 LAB
CULTURE RESULTS: SIGNIFICANT CHANGE UP
CULTURE RESULTS: SIGNIFICANT CHANGE UP
SPECIMEN SOURCE: SIGNIFICANT CHANGE UP

## 2021-07-27 ENCOUNTER — APPOINTMENT (OUTPATIENT)
Dept: INTERNAL MEDICINE | Facility: CLINIC | Age: 56
End: 2021-07-27
Payer: COMMERCIAL

## 2021-07-27 ENCOUNTER — EMERGENCY (EMERGENCY)
Facility: HOSPITAL | Age: 56
LOS: 1 days | Discharge: HOME | End: 2021-07-27
Attending: EMERGENCY MEDICINE | Admitting: EMERGENCY MEDICINE
Payer: COMMERCIAL

## 2021-07-27 VITALS
HEART RATE: 102 BPM | WEIGHT: 162 LBS | SYSTOLIC BLOOD PRESSURE: 144 MMHG | HEIGHT: 70 IN | OXYGEN SATURATION: 96 % | BODY MASS INDEX: 23.19 KG/M2 | DIASTOLIC BLOOD PRESSURE: 92 MMHG | TEMPERATURE: 98.4 F

## 2021-07-27 VITALS
OXYGEN SATURATION: 97 % | SYSTOLIC BLOOD PRESSURE: 152 MMHG | HEART RATE: 65 BPM | DIASTOLIC BLOOD PRESSURE: 95 MMHG | RESPIRATION RATE: 18 BRPM | TEMPERATURE: 98 F

## 2021-07-27 VITALS
TEMPERATURE: 99 F | DIASTOLIC BLOOD PRESSURE: 99 MMHG | HEIGHT: 70 IN | OXYGEN SATURATION: 98 % | RESPIRATION RATE: 20 BRPM | WEIGHT: 164.02 LBS | HEART RATE: 96 BPM | SYSTOLIC BLOOD PRESSURE: 155 MMHG

## 2021-07-27 DIAGNOSIS — Z87.19 PERSONAL HISTORY OF OTHER DISEASES OF THE DIGESTIVE SYSTEM: ICD-10-CM

## 2021-07-27 DIAGNOSIS — R10.9 UNSPECIFIED ABDOMINAL PAIN: ICD-10-CM

## 2021-07-27 DIAGNOSIS — Z87.710 PERSONAL HISTORY OF (CORRECTED) HYPOSPADIAS: ICD-10-CM

## 2021-07-27 DIAGNOSIS — N45.1 EPIDIDYMITIS: ICD-10-CM

## 2021-07-27 DIAGNOSIS — R10.31 RIGHT LOWER QUADRANT PAIN: ICD-10-CM

## 2021-07-27 DIAGNOSIS — Z21 ASYMPTOMATIC HUMAN IMMUNODEFICIENCY VIRUS [HIV] INFECTION STATUS: ICD-10-CM

## 2021-07-27 DIAGNOSIS — Z96.619 PRESENCE OF UNSPECIFIED ARTIFICIAL SHOULDER JOINT: Chronic | ICD-10-CM

## 2021-07-27 DIAGNOSIS — Z88.0 ALLERGY STATUS TO PENICILLIN: ICD-10-CM

## 2021-07-27 LAB
ALBUMIN SERPL ELPH-MCNC: 3.9 G/DL — SIGNIFICANT CHANGE UP (ref 3.3–5)
ALP SERPL-CCNC: 86 U/L — SIGNIFICANT CHANGE UP (ref 40–120)
ALT FLD-CCNC: 18 U/L — SIGNIFICANT CHANGE UP (ref 10–45)
ANION GAP SERPL CALC-SCNC: 8 MMOL/L — SIGNIFICANT CHANGE UP (ref 5–17)
APPEARANCE UR: CLEAR — SIGNIFICANT CHANGE UP
AST SERPL-CCNC: 34 U/L — SIGNIFICANT CHANGE UP (ref 10–40)
BASOPHILS # BLD AUTO: 0.07 K/UL — SIGNIFICANT CHANGE UP (ref 0–0.2)
BASOPHILS NFR BLD AUTO: 0.9 % — SIGNIFICANT CHANGE UP (ref 0–2)
BILIRUB SERPL-MCNC: <0.2 MG/DL — SIGNIFICANT CHANGE UP (ref 0.2–1.2)
BILIRUB UR-MCNC: NEGATIVE — SIGNIFICANT CHANGE UP
BUN SERPL-MCNC: 19 MG/DL — SIGNIFICANT CHANGE UP (ref 7–23)
CALCIUM SERPL-MCNC: 10.1 MG/DL — SIGNIFICANT CHANGE UP (ref 8.4–10.5)
CHLORIDE SERPL-SCNC: 104 MMOL/L — SIGNIFICANT CHANGE UP (ref 96–108)
CO2 SERPL-SCNC: 26 MMOL/L — SIGNIFICANT CHANGE UP (ref 22–31)
COLOR SPEC: YELLOW — SIGNIFICANT CHANGE UP
CREAT SERPL-MCNC: 1.06 MG/DL — SIGNIFICANT CHANGE UP (ref 0.5–1.3)
DIFF PNL FLD: NEGATIVE — SIGNIFICANT CHANGE UP
EOSINOPHIL # BLD AUTO: 0.15 K/UL — SIGNIFICANT CHANGE UP (ref 0–0.5)
EOSINOPHIL NFR BLD AUTO: 1.9 % — SIGNIFICANT CHANGE UP (ref 0–6)
GLUCOSE SERPL-MCNC: 128 MG/DL — HIGH (ref 70–99)
GLUCOSE UR QL: NEGATIVE — SIGNIFICANT CHANGE UP
HCT VFR BLD CALC: 42.9 % — SIGNIFICANT CHANGE UP (ref 39–50)
HGB BLD-MCNC: 13.6 G/DL — SIGNIFICANT CHANGE UP (ref 13–17)
IMM GRANULOCYTES NFR BLD AUTO: 0.1 % — SIGNIFICANT CHANGE UP (ref 0–1.5)
KETONES UR-MCNC: NEGATIVE — SIGNIFICANT CHANGE UP
LEUKOCYTE ESTERASE UR-ACNC: NEGATIVE — SIGNIFICANT CHANGE UP
LIDOCAIN IGE QN: 29 U/L — SIGNIFICANT CHANGE UP (ref 7–60)
LYMPHOCYTES # BLD AUTO: 2.95 K/UL — SIGNIFICANT CHANGE UP (ref 1–3.3)
LYMPHOCYTES # BLD AUTO: 37.8 % — SIGNIFICANT CHANGE UP (ref 13–44)
MCHC RBC-ENTMCNC: 29.6 PG — SIGNIFICANT CHANGE UP (ref 27–34)
MCHC RBC-ENTMCNC: 31.7 GM/DL — LOW (ref 32–36)
MCV RBC AUTO: 93.5 FL — SIGNIFICANT CHANGE UP (ref 80–100)
MONOCYTES # BLD AUTO: 0.76 K/UL — SIGNIFICANT CHANGE UP (ref 0–0.9)
MONOCYTES NFR BLD AUTO: 9.7 % — SIGNIFICANT CHANGE UP (ref 2–14)
NEUTROPHILS # BLD AUTO: 3.86 K/UL — SIGNIFICANT CHANGE UP (ref 1.8–7.4)
NEUTROPHILS NFR BLD AUTO: 49.6 % — SIGNIFICANT CHANGE UP (ref 43–77)
NITRITE UR-MCNC: NEGATIVE — SIGNIFICANT CHANGE UP
NRBC # BLD: 0 /100 WBCS — SIGNIFICANT CHANGE UP (ref 0–0)
PH UR: 6.5 — SIGNIFICANT CHANGE UP (ref 5–8)
PLATELET # BLD AUTO: 327 K/UL — SIGNIFICANT CHANGE UP (ref 150–400)
POTASSIUM SERPL-MCNC: 4.6 MMOL/L — SIGNIFICANT CHANGE UP (ref 3.5–5.3)
POTASSIUM SERPL-SCNC: 4.6 MMOL/L — SIGNIFICANT CHANGE UP (ref 3.5–5.3)
PROT SERPL-MCNC: 7.1 G/DL — SIGNIFICANT CHANGE UP (ref 6–8.3)
PROT UR-MCNC: NEGATIVE MG/DL — SIGNIFICANT CHANGE UP
RBC # BLD: 4.59 M/UL — SIGNIFICANT CHANGE UP (ref 4.2–5.8)
RBC # FLD: 14 % — SIGNIFICANT CHANGE UP (ref 10.3–14.5)
SODIUM SERPL-SCNC: 138 MMOL/L — SIGNIFICANT CHANGE UP (ref 135–145)
SP GR SPEC: 1.02 — SIGNIFICANT CHANGE UP (ref 1–1.03)
UROBILINOGEN FLD QL: 0.2 E.U./DL — SIGNIFICANT CHANGE UP
WBC # BLD: 7.8 K/UL — SIGNIFICANT CHANGE UP (ref 3.8–10.5)
WBC # FLD AUTO: 7.8 K/UL — SIGNIFICANT CHANGE UP (ref 3.8–10.5)

## 2021-07-27 PROCEDURE — 99285 EMERGENCY DEPT VISIT HI MDM: CPT | Mod: 25

## 2021-07-27 PROCEDURE — 96374 THER/PROPH/DIAG INJ IV PUSH: CPT | Mod: XU

## 2021-07-27 PROCEDURE — 99285 EMERGENCY DEPT VISIT HI MDM: CPT

## 2021-07-27 PROCEDURE — 99213 OFFICE O/P EST LOW 20 MIN: CPT

## 2021-07-27 PROCEDURE — 80053 COMPREHEN METABOLIC PANEL: CPT

## 2021-07-27 PROCEDURE — 81003 URINALYSIS AUTO W/O SCOPE: CPT

## 2021-07-27 PROCEDURE — 85025 COMPLETE CBC W/AUTO DIFF WBC: CPT

## 2021-07-27 PROCEDURE — 99282 EMERGENCY DEPT VISIT SF MDM: CPT

## 2021-07-27 PROCEDURE — 83690 ASSAY OF LIPASE: CPT

## 2021-07-27 PROCEDURE — 76870 US EXAM SCROTUM: CPT | Mod: 26

## 2021-07-27 PROCEDURE — 74177 CT ABD & PELVIS W/CONTRAST: CPT | Mod: MA

## 2021-07-27 PROCEDURE — 87086 URINE CULTURE/COLONY COUNT: CPT

## 2021-07-27 PROCEDURE — 96376 TX/PRO/DX INJ SAME DRUG ADON: CPT

## 2021-07-27 PROCEDURE — 99072 ADDL SUPL MATRL&STAF TM PHE: CPT

## 2021-07-27 PROCEDURE — 76870 US EXAM SCROTUM: CPT

## 2021-07-27 PROCEDURE — 74177 CT ABD & PELVIS W/CONTRAST: CPT | Mod: 26,MA

## 2021-07-27 PROCEDURE — 36415 COLL VENOUS BLD VENIPUNCTURE: CPT

## 2021-07-27 RX ORDER — MORPHINE SULFATE 50 MG/1
4 CAPSULE, EXTENDED RELEASE ORAL ONCE
Refills: 0 | Status: DISCONTINUED | OUTPATIENT
Start: 2021-07-27 | End: 2021-07-27

## 2021-07-27 RX ORDER — SODIUM CHLORIDE 9 MG/ML
1000 INJECTION INTRAMUSCULAR; INTRAVENOUS; SUBCUTANEOUS ONCE
Refills: 0 | Status: COMPLETED | OUTPATIENT
Start: 2021-07-27 | End: 2021-07-27

## 2021-07-27 RX ORDER — IOHEXOL 300 MG/ML
30 INJECTION, SOLUTION INTRAVENOUS ONCE
Refills: 0 | Status: COMPLETED | OUTPATIENT
Start: 2021-07-27 | End: 2021-07-27

## 2021-07-27 RX ORDER — TAMSULOSIN HYDROCHLORIDE 0.4 MG/1
1 CAPSULE ORAL
Qty: 10 | Refills: 0
Start: 2021-07-27 | End: 2021-08-05

## 2021-07-27 RX ADMIN — MORPHINE SULFATE 4 MILLIGRAM(S): 50 CAPSULE, EXTENDED RELEASE ORAL at 19:00

## 2021-07-27 RX ADMIN — SODIUM CHLORIDE 1000 MILLILITER(S): 9 INJECTION INTRAMUSCULAR; INTRAVENOUS; SUBCUTANEOUS at 15:27

## 2021-07-27 RX ADMIN — MORPHINE SULFATE 4 MILLIGRAM(S): 50 CAPSULE, EXTENDED RELEASE ORAL at 18:02

## 2021-07-27 RX ADMIN — Medication 100 MILLIGRAM(S): at 20:03

## 2021-07-27 RX ADMIN — IOHEXOL 30 MILLILITER(S): 300 INJECTION, SOLUTION INTRAVENOUS at 15:27

## 2021-07-27 RX ADMIN — MORPHINE SULFATE 4 MILLIGRAM(S): 50 CAPSULE, EXTENDED RELEASE ORAL at 15:27

## 2021-07-27 NOTE — CONSULT NOTE ADULT - SUBJECTIVE AND OBJECTIVE BOX
CONSULT NOTE:    HPI: 57 yo m pmhx HIV, anxiety, HLD presenting to ER with RLQ pain. Patient states the pain began last thursday 7/22, so  he came to the ER, had a CT and US at that time and was diagnosed with epidiymitis, he was started on doxy and discharged home. He states the pain did not worsen , but it also didnt get better so he came back to the Er today. He c/o 1 episode of vomiting friday, denies any nausea. He also c/o constipation which is chronic. Denies any fever or chills. Denies any hematuria or dysuria but complains of what he describes as his " usual frequency of urination and difficulty emptying his bladder".      Vital Signs Last 24 Hrs  T(C): 37 (27 Jul 2021 17:35), Max: 37.1 (27 Jul 2021 14:13)  T(F): 98.6 (27 Jul 2021 17:35), Max: 98.7 (27 Jul 2021 14:13)  HR: 89 (27 Jul 2021 17:35) (89 - 96)  BP: 153/100 (27 Jul 2021 17:35) (153/100 - 155/99)  BP(mean): --  RR: 18 (27 Jul 2021 17:35) (18 - 20)  SpO2: 99% (27 Jul 2021 17:35) (98% - 99%)  I&O's Summary      PE:  Gen: NAD  Abd: sot, ND, + mild TTP RLQ  : circumcised phallus, no blood or discharge at meatus, + enlarged tender right testicle, mild scrotal edema, no scrotal erythema      LABS:                        13.6   7.80  )-----------( 327      ( 27 Jul 2021 14:51 )             42.9     07-27    138  |  104  |  19  ----------------------------<  128<H>  4.6   |  26  |  1.06    Ca    10.1      27 Jul 2021 14:51    TPro  7.1  /  Alb  3.9  /  TBili  <0.2  /  DBili  x   /  AST  34  /  ALT  18  /  AlkPhos  86  07-27      Cultures      A/P 57 yo m with possible right epididymal abscess  1) please send urine culture  2) please get PVR   3) Rec scrotal US  4) discussed with gu team  CONSULT NOTE:    HPI: 57 yo m pmhx HIV, anxiety, HLD presenting to ER with RLQ pain. Patient states the pain began last thursday 7/22, so  he came to the ER, had a CT and US at that time and was diagnosed with epidiymitis, he was started on doxy and discharged home. He states the pain did not worsen , but it also didnt get better so he came back to the Er today. He c/o 1 episode of vomiting friday, denies any nausea. He also c/o constipation which is chronic. Denies any fever or chills. Denies any hematuria or dysuria but complains of what he describes as his " usual frequency of urination and difficulty emptying his bladder".      Vital Signs Last 24 Hrs  T(C): 37 (27 Jul 2021 17:35), Max: 37.1 (27 Jul 2021 14:13)  T(F): 98.6 (27 Jul 2021 17:35), Max: 98.7 (27 Jul 2021 14:13)  HR: 89 (27 Jul 2021 17:35) (89 - 96)  BP: 153/100 (27 Jul 2021 17:35) (153/100 - 155/99)  BP(mean): --  RR: 18 (27 Jul 2021 17:35) (18 - 20)  SpO2: 99% (27 Jul 2021 17:35) (98% - 99%)  I&O's Summary      PE:  Gen: NAD  Abd: sot, ND, + mild TTP RLQ  : circumcised phallus, no blood or discharge at meatus, + enlarged tender right testicle, mild scrotal edema, no scrotal erythema      LABS:                        13.6   7.80  )-----------( 327      ( 27 Jul 2021 14:51 )             42.9     07-27    138  |  104  |  19  ----------------------------<  128<H>  4.6   |  26  |  1.06    Ca    10.1      27 Jul 2021 14:51    TPro  7.1  /  Alb  3.9  /  TBili  <0.2  /  DBili  x   /  AST  34  /  ALT  18  /  AlkPhos  86  07-27      Cultures      A/P 57 yo Male w/ improving epididymitis .  Ultrasound shows improvement of epididymitis. No noted epididymal abscess on US.  Patient is urinating, though PVR bladder scan was shown to be 150cc w/ no discomfort.  He was educated that pain and swelling can take weeks to months to show improvement, and that he should take MOtrin/ Tylenol for pain/discomfort.  Educated patient to elevate scrotum, ice packs, and f/u with Dr. Arroyo as scheduled.  He was also advised to continue taking his Abx until completion.    1) please send urine culture  2) PVR: 150cc   4) No acute surgical intervention or admission needed at this time.  3) Scrotal US: No abscess  4) discussed with gu team

## 2021-07-27 NOTE — ASSESSMENT
[FreeTextEntry1] : Abdominal pain ; Question etiology;\par Could be pancreatitis from the HIV medication\par Will send to ED for evaluation again\par Likely will need to have repeat scan

## 2021-07-27 NOTE — HISTORY OF PRESENT ILLNESS
[FreeTextEntry1] : Has significant abdominal pain for a week; Seen in ED and CT noted; Large amount of stool [de-identified] : As above no nausea or vomiting

## 2021-07-27 NOTE — ED PROVIDER NOTE - SHIFT CHANGE DETAILS
56M hiv found to have poss epidiymitis w/ abscess on ct but no on us. urology consulted.     dispo: pending urology reccs

## 2021-07-27 NOTE — CONSULT NOTE ADULT - ASSESSMENT
56M PMH HIV, HBV, anxiety, PSHx b/l inguinal hernia repair, shoulder repair presents w/ RLQ pain x5 days, was seen in ED 7/22/21 and discharged home on abx for possible epididymitis, returns today w similar pain.     - Large stool burden noted on CT  - Bowel regimen not limited to miralax vs golytely along w stool softener   - No indication for surgical admission or intervention  - Liquid diet if possible  - Surgery team 1c following  - Seen and examined w chief resident  - Discussed w attending physician 56M PMH HIV, HBV, anxiety, PSHx b/l inguinal hernia repair, shoulder repair presents w/ RLQ pain x5 days, was seen in ED 7/22/21 and discharged home on abx for possible epididymitis, returns today w similar pain.     - Large stool burden noted on CT  - Bowel regimen not limited to miralax vs golytely along w stool softener   - No indication for surgical admission or intervention  - Liquid diet if possible  - Surgery team 1c following  - Seen and examined w chief resident  - Discussed w attending physician    SENIOR SURGERY RESIDENT ADDENDUM:  Agree with above. Patient appears nontoxic. CT reviewed- large stool burden. Recommend aggressive bowel regnimen.

## 2021-07-27 NOTE — ED PROVIDER NOTE - PATIENT PORTAL LINK FT
You can access the FollowMyHealth Patient Portal offered by [facility] by registering at the following website: [email or web address]. By joining [facility]’s FollowMyHealth portal, you will also be able to view your health information using other applications (apps) compatible with our system.

## 2021-07-27 NOTE — ED PROVIDER NOTE - PROGRESS NOTE DETAILS
Jalen: cleared by urology for DC to continue doxycycline continue course. start flomax. berookhim at scheduled appt. Jalen: cleared by urology for DC to continue doxycycline  to complete course. start flomax. f/u dr taylor at scheduled appt.

## 2021-07-27 NOTE — ED PROVIDER NOTE - OBJECTIVE STATEMENT
57 y/o M PMHx HIV (on Bictarvy), presenting with persistent RLQ abdominal pain and testicular pain since 6 days. It is not worsening in nature. Denies associated fevers, n/v/d. Pt was seen 5 days ago with CT and US done, showing epididymitis and scrotal wall cellulitis. pt was discharged on Doxycycline which he states that he has been using. Pt's pain is cramping and aching in nature. Denies dysuria, hematuria, and flank pain.
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
## 2021-07-27 NOTE — ED ADULT TRIAGE NOTE - CHIEF COMPLAINT QUOTE
57 y/o male c/o abdominal pain, currently being treated for UTI, states Dr. Patton wants to r/o pancreatitis, seen in ED 5 days ago.

## 2021-07-27 NOTE — CONSULT NOTE ADULT - SUBJECTIVE AND OBJECTIVE BOX
General Surgery Consult      Consulting surgical team: 1c  Consulting attending: Dr. Velázquez      HPI: 56M PMH HIV, HBV, anxiety, PSHx b/l inguinal hernia repair, shoulder repair presents w/ RLQ pain x5 days, was seen in ED 21 and discharged home on abx for possible epididymitis, returns today w similar pain. General surgery consulted to assess abdominal pain. Patient reports that the pain has been persistent, but it has not worsened. Reports mild nausea. Denies vomiting. Tolerating po solid food. Denies blood in stool. Having BMs regularly and passing gas. Last used percocet last night. Was scheduled for Cscope in March, but due to poor prep was unable to have scope.     PMH: HIV, HBV, anxiety    PSHx: b/l inguinal hernia repair, shoulder repair     MEDICATIONS: has been on doxycycline and percocet at home since ED     ALL:  PCN    VITALS & I/Os:  Vital Signs Last 24 Hrs  T(C): 37 (2021 17:35), Max: 37.1 (2021 14:13)  T(F): 98.6 (2021 17:35), Max: 98.7 (2021 14:13)  HR: 89 (2021 17:35) (89 - 96)  BP: 153/100 (2021 17:35) (153/100 - 155/99)  BP(mean): --  RR: 18 (2021 17:35) (18 - 20)  SpO2: 99% (2021 17:35) (98% - 99%)    PHYSICAL EXAM:  General: No acute distress  Respiratory: Nonlabored  Cardiovascular: normotensive, regular rate  Abdominal: Soft, nondistended, nontender. No rebound or guarding. No organomegaly, no palpable mass.  Extremities: Warm      LABS:                        13.6   7.80  )-----------( 327      ( 2021 14:51 )             42.9     07-27    138  |  104  |  19  ----------------------------<  128<H>  4.6   |  26  |  1.06    Ca    10.1      2021 14:51    TPro  7.1  /  Alb  3.9  /  TBili  <0.2  /  DBili  x   /  AST  34  /  ALT  18  /  AlkPhos  86      Urinalysis Basic - ( 2021 14:51 )    Color: Yellow / Appearance: Clear / S.020 / pH: x  Gluc: x / Ketone: NEGATIVE  / Bili: Negative / Urobili: 0.2 E.U./dL   Blood: x / Protein: NEGATIVE mg/dL / Nitrite: NEGATIVE   Leuk Esterase: NEGATIVE / RBC: x / WBC x   Sq Epi: x / Non Sq Epi: x / Bacteria: x          IMAGING:  < from: CT Abdomen and Pelvis w/ Oral Cont and w/ IV Cont (21 @ 16:43) >  IMPRESSION:  1.  Since 2021, new right sided paratesticular structure with rim enhancement and central low density 0.8 cm, may represent epididymal abscess in this patient with recent epididymitis. Recommend clinical correlation and consider ultrasound follow-up. Unchanged bilateral hydroceles, right greater than left.  2.  Unchanged fluid distended bladder with mild wall thickening. Consider urinalysis correlation.  3.  Unchanged large stool burden throughout the right and transverse colon consistent with constipation.

## 2021-07-27 NOTE — ED PROVIDER NOTE - CARE PROVIDER_API CALL
Keshawn Arroyo  Urology  [address], [facility] [place] [address]  Phone: [phone]  Fax: [phone]  Follow Up Time:

## 2021-07-27 NOTE — ED PROVIDER NOTE - CLINICAL SUMMARY MEDICAL DECISION MAKING FREE TEXT BOX
55 y/o M PMHx HIV, presenting with abdominal pain and persistent testicular pain. Noted epididymitis on prior workup, suspect continued symptoms from epididymitis. Given Hx hernia repair and persistent pain, will obtain CT abdomen and pelvis with oral IV contrast that was not completed last time to look for obstruction/infectious process.

## 2021-07-29 DIAGNOSIS — K59.00 CONSTIPATION, UNSPECIFIED: ICD-10-CM

## 2021-07-29 LAB
CULTURE RESULTS: SIGNIFICANT CHANGE UP
SPECIMEN SOURCE: SIGNIFICANT CHANGE UP

## 2021-07-29 RX ORDER — LACTULOSE 10 G/15ML
20 SOLUTION ORAL TWICE DAILY
Qty: 1800 | Refills: 3 | Status: ACTIVE | COMMUNITY
Start: 2021-07-29 | End: 1900-01-01

## 2021-08-17 ENCOUNTER — APPOINTMENT (OUTPATIENT)
Dept: UROLOGY | Facility: CLINIC | Age: 56
End: 2021-08-17
Payer: COMMERCIAL

## 2021-08-17 ENCOUNTER — NON-APPOINTMENT (OUTPATIENT)
Age: 56
End: 2021-08-17

## 2021-08-17 VITALS — DIASTOLIC BLOOD PRESSURE: 92 MMHG | TEMPERATURE: 97.3 F | SYSTOLIC BLOOD PRESSURE: 142 MMHG | HEART RATE: 112 BPM

## 2021-08-17 PROBLEM — N45.1 EPIDIDYMITIS: Chronic | Status: ACTIVE | Noted: 2021-07-27

## 2021-08-17 PROCEDURE — 99204 OFFICE O/P NEW MOD 45 MIN: CPT

## 2021-08-17 NOTE — HISTORY OF PRESENT ILLNESS
[Currently Experiencing ___] :  [unfilled] [Urinary Urgency] : urinary urgency [Urinary Frequency] : urinary frequency [Nocturia] : nocturia [Weak Stream] : weak stream [FreeTextEntry1] : This is a 56 year old male hx HIV (on Bictarvy), presents to establish care\par recently seen in ER for persistent RLQ abdominal pain and testicular pain since 6 days. Patient reports using sex toys  and 1-2 weeks prior to pain \par discharged on Doxycycline, UA UCX - negative 7/27/2021\par complaints of urinary hesitancy, urgency, frequency and nocturia 1-2X\par Weak FOS, intermittency. \par Denies dysuria, hematuria and flank pain \par svere stress\par spontaneously tearful in office\par \par Social Hx. Denies smoking, social alcohol\par FHx: denies cancer\par \par US Testicle  7/27/2021\par b/l epididymitis, left varicocele, no abscess\par \par CT ABdomen 7/27/2021\par bilate hydrocele, distended bladder with mils wall thickening\par \par  \par all urine cultures negative. UA not suggestive of infection [Urinary Retention] : no urinary retention [Dysuria] : no dysuria [Hematuria - Gross] : no gross hematuria [Hematuria - Microscopic] : no microscopic hematuria [Abdominal Pain] : no abdominal pain [Flank Pain] : no flank pain [Fever] : no fever [Nausea] : no nausea

## 2021-08-17 NOTE — ASSESSMENT
[FreeTextEntry1] : BPH with LUTS\par discussed causes and other treatment options\par Trial tamsulosin 0.4 mg\par Labs today: \par UA, UCx,\par PVR 20 ml- r/o urinary retention\par \par Chronic Pelvic Pain \par Scrotal Pain\par benign findings\par advises to rest from using sex toys/scrotal rings. \par recommend warm sits bath 20 ins every night, NSAIDs for pain\par Pelvic floor exercise. \par \par US Testicle  7/27/2021\par b/l epididymitis, left varicocele, no abscess\par CT ABdomen 7/27/2021\par bilateral  hydrocele, distended bladder with mild wall thickening\par \par \par Follow up in month with PSA

## 2021-08-17 NOTE — PHYSICAL EXAM
[General Appearance - Well Developed] : well developed [General Appearance - Well Nourished] : well nourished [Normal Appearance] : normal appearance [Well Groomed] : well groomed [General Appearance - In No Acute Distress] : no acute distress [Edema] : no peripheral edema [Respiration, Rhythm And Depth] : normal respiratory rhythm and effort [Exaggerated Use Of Accessory Muscles For Inspiration] : no accessory muscle use [Abdomen Soft] : soft [Abdomen Tenderness] : non-tender [Costovertebral Angle Tenderness] : no ~M costovertebral angle tenderness [Urethral Meatus] : meatus normal [Urinary Bladder Findings] : the bladder was normal on palpation [Scrotum] : the scrotum was normal [Testes Mass (___cm)] : there were no testicular masses [No Prostate Nodules] : no prostate nodules [Normal Station and Gait] : the gait and station were normal for the patient's age [] : no rash [No Focal Deficits] : no focal deficits [Oriented To Time, Place, And Person] : oriented to person, place, and time [Affect] : the affect was normal [Mood] : the mood was normal [Not Anxious] : not anxious [No Palpable Adenopathy] : no palpable adenopathy [FreeTextEntry1] : Prostate firm and enlarged, left grade 3 varicocele, bilateral hydrocele

## 2021-08-20 ENCOUNTER — TRANSCRIPTION ENCOUNTER (OUTPATIENT)
Age: 56
End: 2021-08-20

## 2021-08-23 LAB
APPEARANCE: ABNORMAL
BACTERIA UR CULT: NORMAL
BACTERIA: NEGATIVE
BILIRUBIN URINE: NEGATIVE
BLOOD URINE: NEGATIVE
COLOR: YELLOW
GLUCOSE QUALITATIVE U: NEGATIVE
HYALINE CASTS: 1 /LPF
KETONES URINE: NEGATIVE
LEUKOCYTE ESTERASE URINE: NEGATIVE
MICROSCOPIC-UA: NORMAL
NITRITE URINE: NEGATIVE
PH URINE: 7
PROTEIN URINE: NORMAL
RED BLOOD CELLS URINE: 2 /HPF
SPECIFIC GRAVITY URINE: 1.02
SQUAMOUS EPITHELIAL CELLS: 1 /HPF
UROBILINOGEN URINE: NORMAL
WHITE BLOOD CELLS URINE: 1 /HPF

## 2021-10-05 ENCOUNTER — APPOINTMENT (OUTPATIENT)
Dept: UROLOGY | Facility: CLINIC | Age: 56
End: 2021-10-05
Payer: COMMERCIAL

## 2021-10-05 VITALS — TEMPERATURE: 98.1 F | HEART RATE: 80 BPM | DIASTOLIC BLOOD PRESSURE: 86 MMHG | SYSTOLIC BLOOD PRESSURE: 145 MMHG

## 2021-10-05 DIAGNOSIS — G89.29 PELVIC AND PERINEAL PAIN: ICD-10-CM

## 2021-10-05 DIAGNOSIS — R10.2 PELVIC AND PERINEAL PAIN: ICD-10-CM

## 2021-10-05 DIAGNOSIS — N40.0 BENIGN PROSTATIC HYPERPLASIA WITHOUT LOWER URINARY TRACT SYMPMS: ICD-10-CM

## 2021-10-05 PROCEDURE — 99213 OFFICE O/P EST LOW 20 MIN: CPT

## 2021-10-06 NOTE — ASSESSMENT
[FreeTextEntry1] : BPH with LUTS\par improved symptoms\par No nocturia\par Continue tamsulosin 0.4 mg\par PSA\par \par Chronic Pelvic Pain \par resolving \par benign findings\par Continue to rest from using sex toys/scrotal rings. \par Continue  warm sits bath 20 ins every night, NSAIDs for pain\par Pelvic floor exercise. \par \par follow up annually. \par \par

## 2021-10-06 NOTE — HISTORY OF PRESENT ILLNESS
[None] : None [FreeTextEntry1] : This is 56M with HIV (on Bictarvy), chronic Pelvic pain syndrome, BPH, syncope episode from labd yesterday\par Returns for follow up\par reports improved symptoms\par Good FOS, no nocturia,\par Currently on Tamsulosin 0.4 mg, \par Denies dysuria, hematuria, flank pain \par

## 2022-04-11 PROBLEM — J04.0 REFLUX LARYNGITIS: Status: ACTIVE | Noted: 2019-02-19

## 2022-09-14 ENCOUNTER — APPOINTMENT (OUTPATIENT)
Dept: OTOLARYNGOLOGY | Facility: CLINIC | Age: 57
End: 2022-09-14

## 2022-09-14 DIAGNOSIS — H93.8X1 OTHER SPECIFIED DISORDERS OF RIGHT EAR: ICD-10-CM

## 2022-09-14 DIAGNOSIS — H92.01 OTALGIA, RIGHT EAR: ICD-10-CM

## 2022-09-14 DIAGNOSIS — H93.13 TINNITUS, BILATERAL: ICD-10-CM

## 2022-09-14 PROCEDURE — 99213 OFFICE O/P EST LOW 20 MIN: CPT | Mod: 25

## 2022-09-14 PROCEDURE — 87070 CULTURE OTHR SPECIMN AEROBIC: CPT

## 2022-09-14 PROCEDURE — 31231 NASAL ENDOSCOPY DX: CPT | Mod: 52

## 2022-09-14 RX ORDER — METHYLPREDNISOLONE 4 MG/1
4 TABLET ORAL
Qty: 1 | Refills: 0 | Status: ACTIVE | COMMUNITY
Start: 2022-09-14 | End: 1900-01-01

## 2022-09-14 RX ORDER — SULFAMETHOXAZOLE AND TRIMETHOPRIM 800; 160 MG/1; MG/1
800-160 TABLET ORAL TWICE DAILY
Qty: 14 | Refills: 0 | Status: ACTIVE | COMMUNITY
Start: 2022-09-14 | End: 1900-01-01

## 2022-09-14 NOTE — REVIEW OF SYSTEMS
[Patient Intake Form Reviewed] : Patient intake form was reviewed [Ear Pain] : ear pain [Hearing Loss] : hearing loss [Ear Noises] : ear noises [As Noted in HPI] : as noted in HPI [Nasal Congestion] : nasal congestion [Sinus Pain] : sinus pain [Negative] : Heme/Lymph

## 2022-09-14 NOTE — HISTORY OF PRESENT ILLNESS
[de-identified] : 57 years old male patient with history of Right ear change in hearing, nasal congestion and Right ear pressure for the past couple of days.  Patient is present today in the office with Sinusitis.  Right side Otitis media / Middle ear infection .  Deviated Nasal Septum.  Turbinate Hypertrophy

## 2022-09-14 NOTE — PROCEDURE
[Congested] : congested [Deviated to the Lt] : deviated to the left [Image(s) Captured] : image(s) captured and filed [Topical Lidocaine] : topical lidocaine [Oxymetazoline HCl] : oxymetazoline HCl [Flexible Endoscope] : examined with the flexible endoscope [Serial Number: ___] : Serial Number: [unfilled] [FreeTextEntry6] : Turbinate Hypertrophy SINUSITIS Rt CnS done [de-identified] : Sinusitis.  Right side Otitis media / Middle ear infection .  Deviated Nasal Septum.  Turbinate Hypertrophy

## 2022-09-14 NOTE — REASON FOR VISIT
[Subsequent Evaluation] : a subsequent evaluation for [FreeTextEntry2] : Right ear change in hearing, nasal congestion and Right ear pressure for the past couple of days.

## 2022-09-14 NOTE — PHYSICAL EXAM
[Normal] : no rashes [de-identified] :  Right side Otitis media / Middle ear infection .  Deviated Nasal Septum.  Turbinate Hypertrophy SINUSITIS [de-identified] : deviated nasal septum.  Reflux laryngitis.  Purulent post nasal drip

## 2022-09-16 ENCOUNTER — APPOINTMENT (OUTPATIENT)
Dept: CT IMAGING | Facility: CLINIC | Age: 57
End: 2022-09-16

## 2022-09-16 ENCOUNTER — OUTPATIENT (OUTPATIENT)
Dept: OUTPATIENT SERVICES | Facility: HOSPITAL | Age: 57
LOS: 1 days | End: 2022-09-16

## 2022-09-16 DIAGNOSIS — Z96.619 PRESENCE OF UNSPECIFIED ARTIFICIAL SHOULDER JOINT: Chronic | ICD-10-CM

## 2022-09-16 PROCEDURE — 70486 CT MAXILLOFACIAL W/O DYE: CPT | Mod: 26

## 2022-09-20 ENCOUNTER — APPOINTMENT (OUTPATIENT)
Dept: OTOLARYNGOLOGY | Facility: CLINIC | Age: 57
End: 2022-09-20

## 2022-09-20 VITALS
RESPIRATION RATE: 16 BRPM | SYSTOLIC BLOOD PRESSURE: 129 MMHG | DIASTOLIC BLOOD PRESSURE: 77 MMHG | TEMPERATURE: 98.2 F | HEART RATE: 75 BPM | HEIGHT: 69 IN | OXYGEN SATURATION: 97 % | BODY MASS INDEX: 25.18 KG/M2 | WEIGHT: 170 LBS

## 2022-09-20 DIAGNOSIS — J32.8 OTHER CHRONIC SINUSITIS: ICD-10-CM

## 2022-09-20 PROCEDURE — 99213 OFFICE O/P EST LOW 20 MIN: CPT

## 2022-09-20 NOTE — HISTORY OF PRESENT ILLNESS
[de-identified] : 57 years old male patient with history of Right ear change in hearing, nasal congestion and Right ear pressure for the past couple of days.  Patient is present today in the office with Sinusitis.  Right side Otitis media / Middle ear infection .  Deviated Nasal Septum.  Turbinate Hypertrophy.  Sinus CT-Scan impression Pansinusitis

## 2022-09-20 NOTE — PROCEDURE
[Congested] : congested [Deviated to the Lt] : deviated to the left [Image(s) Captured] : image(s) captured and filed [Topical Lidocaine] : topical lidocaine [Oxymetazoline HCl] : oxymetazoline HCl [Flexible Endoscope] : examined with the flexible endoscope [Serial Number: ___] : Serial Number: [unfilled] [FreeTextEntry6] : Turbinate Hypertrophy SINUSITIS Rt CnS done [de-identified] : Sinusitis.  Right side Otitis media / Middle ear infection .  Deviated Nasal Septum.  Turbinate Hypertrophy

## 2022-09-20 NOTE — PHYSICAL EXAM
[Normal] : no rashes [de-identified] :  Right side Otitis media / Middle ear infection .  Deviated Nasal Septum.  Turbinate Hypertrophy SINUSITIS [de-identified] : deviated nasal septum.  Reflux laryngitis.  Purulent post nasal drip

## 2022-09-22 LAB — EAR NOSE AND THROAT CULTURE: ABNORMAL

## 2022-10-03 ENCOUNTER — APPOINTMENT (OUTPATIENT)
Dept: OTOLARYNGOLOGY | Facility: CLINIC | Age: 57
End: 2022-10-03

## 2022-10-03 VITALS
WEIGHT: 170 LBS | BODY MASS INDEX: 25.18 KG/M2 | DIASTOLIC BLOOD PRESSURE: 89 MMHG | RESPIRATION RATE: 16 BRPM | SYSTOLIC BLOOD PRESSURE: 137 MMHG | OXYGEN SATURATION: 95 % | HEIGHT: 69 IN | HEART RATE: 69 BPM

## 2022-10-03 DIAGNOSIS — Z01.818 ENCOUNTER FOR OTHER PREPROCEDURAL EXAMINATION: ICD-10-CM

## 2022-10-03 PROCEDURE — 99213 OFFICE O/P EST LOW 20 MIN: CPT

## 2022-10-03 RX ORDER — SULFAMETHOXAZOLE AND TRIMETHOPRIM 400; 80 MG/1; MG/1
400-80 TABLET ORAL TWICE DAILY
Qty: 14 | Refills: 0 | Status: ACTIVE | COMMUNITY
Start: 2022-10-03 | End: 1900-01-01

## 2022-10-03 NOTE — HISTORY OF PRESENT ILLNESS
[de-identified] : 57 years old male patient with history of change in hearing > on the right side.   Patient is present today in the office with Sinusitis.Improved  Right side Otitis media / Middle ear infection .  Deviated Nasal Septum.  Turbinate Hypertrophy. \par Lt Otitis Media

## 2022-10-03 NOTE — REVIEW OF SYSTEMS
[Patient Intake Form Reviewed] : Patient intake form was reviewed [Ear Pain] : ear pain [Hearing Loss] : hearing loss [Ear Noises] : ear noises [As Noted in HPI] : as noted in HPI [Nasal Congestion] : nasal congestion [Sinus Pain] : sinus pain [Sinus Pressure] : sinus pressure [Negative] : Heme/Lymph

## 2022-10-03 NOTE — PHYSICAL EXAM
[Normal] : no rashes [de-identified] : Lt Otitis Media [de-identified] :  Right side Otitis media / Middle ear infection .  Deviated Nasal Septum.  Turbinate Hypertrophy SINUSITIS [de-identified] : deviated nasal septum.  Reflux laryngitis.  Purulent post nasal drip

## 2022-10-03 NOTE — PROCEDURE
[Congested] : congested [Deviated to the Lt] : deviated to the left [Flexible Endoscope] : examined with the flexible endoscope

## 2022-10-04 ENCOUNTER — APPOINTMENT (OUTPATIENT)
Dept: OTOLARYNGOLOGY | Facility: CLINIC | Age: 57
End: 2022-10-04

## 2022-10-05 ENCOUNTER — RX RENEWAL (OUTPATIENT)
Age: 57
End: 2022-10-05

## 2022-10-05 RX ORDER — TAMSULOSIN HYDROCHLORIDE 0.4 MG/1
0.4 CAPSULE ORAL
Qty: 30 | Refills: 0 | Status: ACTIVE | COMMUNITY
Start: 2021-08-17 | End: 1900-01-01

## 2022-10-07 NOTE — ASU PATIENT PROFILE, ADULT - NSICDXPASTMEDICALHX_GEN_ALL_CORE_FT
PAST MEDICAL HISTORY:  BPH (benign prostatic hyperplasia)     High cholesterol     HIV (human immunodeficiency virus infection)

## 2022-10-10 ENCOUNTER — TRANSCRIPTION ENCOUNTER (OUTPATIENT)
Age: 57
End: 2022-10-10

## 2022-10-10 PROBLEM — N40.0 BENIGN PROSTATIC HYPERPLASIA WITHOUT LOWER URINARY TRACT SYMPTOMS: Chronic | Status: ACTIVE | Noted: 2022-10-07

## 2022-10-10 PROBLEM — E78.00 PURE HYPERCHOLESTEROLEMIA, UNSPECIFIED: Chronic | Status: ACTIVE | Noted: 2022-10-07

## 2022-10-10 LAB — SARS-COV-2 N GENE NPH QL NAA+PROBE: NOT DETECTED

## 2022-10-10 RX ORDER — METHYLPREDNISOLONE 4 MG/1
4 TABLET ORAL
Qty: 1 | Refills: 0 | Status: ACTIVE | COMMUNITY
Start: 2022-10-10 | End: 1900-01-01

## 2022-10-10 RX ORDER — DOCUSATE SODIUM 100 MG/1
100 CAPSULE ORAL TWICE DAILY
Qty: 60 | Refills: 0 | Status: ACTIVE | COMMUNITY
Start: 2022-10-10 | End: 1900-01-01

## 2022-10-10 RX ORDER — AZITHROMYCIN 250 MG/1
250 TABLET, FILM COATED ORAL
Qty: 1 | Refills: 0 | Status: ACTIVE | COMMUNITY
Start: 2022-10-10 | End: 1900-01-01

## 2022-10-10 NOTE — BRIEF OPERATIVE NOTE - NSICDXBRIEFPROCEDURE_GEN_ALL_CORE_FT
PROCEDURES:  Functional endoscopic sinus surgery (FESS) with antrostomy of right maxillary sinus with ethmoidectomy and septoplasty 10-Oct-2022 10:21:32  Marni Thompson  Septoplasty or submucous resection 10-Oct-2022 10:22:02  Marni Thompson

## 2022-10-10 NOTE — BRIEF OPERATIVE NOTE - NSICDXBRIEFPOSTOP_GEN_ALL_CORE_FT
POST-OP DIAGNOSIS:  Chronic ethmoidal sinusitis 10-Oct-2022 10:23:42  Marni Thompson  Maxillary sinusitis, unspecified chronicity 10-Oct-2022 10:23:50  Marni Thompson  Frontal sinusitis, unspecified chronicity 10-Oct-2022 10:23:58  Marni Thompson A  Deviated nasal septum 10-Oct-2022 10:24:07  Marni Thompson  Congested nose 10-Oct-2022 10:24:17  Marni Thompson  Ethmoidal polyp 10-Oct-2022 10:24:25  Marni Thompson

## 2022-10-10 NOTE — BRIEF OPERATIVE NOTE - NSICDXBRIEFPREOP_GEN_ALL_CORE_FT
PRE-OP DIAGNOSIS:  Chronic ethmoidal sinusitis 10-Oct-2022 10:22:33  Marni Thompson  Maxillary sinusitis, unspecified chronicity 10-Oct-2022 10:22:43  Marni Thompson  Frontal sinusitis, unspecified chronicity 10-Oct-2022 10:22:53  Marni Thompson A  Deviated nasal septum 10-Oct-2022 10:23:02  Marni Thompson A  Congested nose 10-Oct-2022 10:23:11  Marni Thompson A  Ethmoidal polyp 10-Oct-2022 10:23:21  Marni Thompson

## 2022-10-10 NOTE — ASU DISCHARGE PLAN (ADULT/PEDIATRIC) - CARE PROVIDER_API CALL
Dhaval Saleh)  Otolaryngology  [address], [address]  [place], [place] [address]  Phone: [phone]  Fax: [phone]  Established Patient  Follow Up Time: 1 week

## 2022-10-10 NOTE — ASU DISCHARGE PLAN (ADULT/PEDIATRIC) - NS MD DC FALL RISK RISK
For information on Fall & Injury Prevention, visit: [email or web address] OR  [email or web address] OR  https://www.cdc.gov/steadi/patient.html

## 2022-10-11 ENCOUNTER — RESULT REVIEW (OUTPATIENT)
Age: 57
End: 2022-10-11

## 2022-10-11 ENCOUNTER — OUTPATIENT (OUTPATIENT)
Dept: OUTPATIENT SERVICES | Facility: HOSPITAL | Age: 57
LOS: 1 days | Discharge: HOME | End: 2022-10-11

## 2022-10-11 ENCOUNTER — APPOINTMENT (OUTPATIENT)
Dept: OTOLARYNGOLOGY | Facility: AMBULATORY SURGERY CENTER | Age: 57
End: 2022-10-11

## 2022-10-11 VITALS
RESPIRATION RATE: 16 BRPM | OXYGEN SATURATION: 97 % | TEMPERATURE: 99 F | DIASTOLIC BLOOD PRESSURE: 91 MMHG | SYSTOLIC BLOOD PRESSURE: 135 MMHG | HEART RATE: 68 BPM

## 2022-10-11 VITALS
SYSTOLIC BLOOD PRESSURE: 118 MMHG | RESPIRATION RATE: 16 BRPM | DIASTOLIC BLOOD PRESSURE: 77 MMHG | WEIGHT: 182.32 LBS | OXYGEN SATURATION: 97 % | HEIGHT: 70 IN | TEMPERATURE: 98 F | HEART RATE: 77 BPM

## 2022-10-11 DIAGNOSIS — Z96.619 PRESENCE OF UNSPECIFIED ARTIFICIAL SHOULDER JOINT: Chronic | ICD-10-CM

## 2022-10-11 PROCEDURE — 88300 SURGICAL PATH GROSS: CPT | Mod: 26,59

## 2022-10-11 PROCEDURE — 30520 REPAIR OF NASAL SEPTUM: CPT

## 2022-10-11 PROCEDURE — 31253 NSL/SINS NDSC TOTAL: CPT | Mod: 50

## 2022-10-11 PROCEDURE — 31267 ENDOSCOPY MAXILLARY SINUS: CPT | Mod: 50

## 2022-10-11 PROCEDURE — 88305 TISSUE EXAM BY PATHOLOGIST: CPT | Mod: 26

## 2022-10-11 PROCEDURE — 88311 DECALCIFY TISSUE: CPT | Mod: 26

## 2022-10-11 DEVICE — FLOSEAL FAST PREP 5ML: Type: IMPLANTABLE DEVICE | Status: FUNCTIONAL

## 2022-10-11 DEVICE — SURGIFLO HEMOSTATIC MATRIX KIT: Type: IMPLANTABLE DEVICE | Status: FUNCTIONAL

## 2022-10-11 RX ORDER — HYDROMORPHONE HYDROCHLORIDE 2 MG/ML
0.5 INJECTION INTRAMUSCULAR; INTRAVENOUS; SUBCUTANEOUS
Refills: 0 | Status: DISCONTINUED | OUTPATIENT
Start: 2022-10-11 | End: 2022-10-11

## 2022-10-11 RX ORDER — ONDANSETRON 8 MG/1
4 TABLET, FILM COATED ORAL EVERY 4 HOURS
Refills: 0 | Status: DISCONTINUED | OUTPATIENT
Start: 2022-10-11 | End: 2022-10-11

## 2022-10-11 RX ORDER — SODIUM CHLORIDE 9 MG/ML
1000 INJECTION, SOLUTION INTRAVENOUS
Refills: 0 | Status: DISCONTINUED | OUTPATIENT
Start: 2022-10-11 | End: 2022-10-11

## 2022-10-11 RX ORDER — AZITHROMYCIN 500 MG/1
0 TABLET, FILM COATED ORAL
Qty: 0 | Refills: 0 | DISCHARGE

## 2022-10-11 RX ORDER — DOCUSATE SODIUM 100 MG
1 CAPSULE ORAL
Qty: 0 | Refills: 0 | DISCHARGE

## 2022-10-11 RX ORDER — FENTANYL CITRATE 50 UG/ML
25 INJECTION INTRAVENOUS
Refills: 0 | Status: DISCONTINUED | OUTPATIENT
Start: 2022-10-11 | End: 2022-10-11

## 2022-10-11 RX ORDER — ACETAMINOPHEN 500 MG
650 TABLET ORAL EVERY 6 HOURS
Refills: 0 | Status: DISCONTINUED | OUTPATIENT
Start: 2022-10-11 | End: 2022-10-11

## 2022-10-11 RX ADMIN — HYDROMORPHONE HYDROCHLORIDE 0.5 MILLIGRAM(S): 2 INJECTION INTRAMUSCULAR; INTRAVENOUS; SUBCUTANEOUS at 10:08

## 2022-10-11 RX ADMIN — HYDROMORPHONE HYDROCHLORIDE 0.5 MILLIGRAM(S): 2 INJECTION INTRAMUSCULAR; INTRAVENOUS; SUBCUTANEOUS at 10:23

## 2022-10-11 RX ADMIN — HYDROMORPHONE HYDROCHLORIDE 0.5 MILLIGRAM(S): 2 INJECTION INTRAMUSCULAR; INTRAVENOUS; SUBCUTANEOUS at 10:28

## 2022-10-18 ENCOUNTER — APPOINTMENT (OUTPATIENT)
Dept: OTOLARYNGOLOGY | Facility: CLINIC | Age: 57
End: 2022-10-18

## 2022-10-18 VITALS
HEIGHT: 69 IN | TEMPERATURE: 97.2 F | HEART RATE: 94 BPM | BODY MASS INDEX: 25.18 KG/M2 | DIASTOLIC BLOOD PRESSURE: 85 MMHG | OXYGEN SATURATION: 97 % | WEIGHT: 170 LBS | RESPIRATION RATE: 16 BRPM | SYSTOLIC BLOOD PRESSURE: 139 MMHG

## 2022-10-18 PROCEDURE — 31237 NSL/SINS NDSC SURG BX POLYPC: CPT | Mod: 50,58

## 2022-10-18 PROCEDURE — 99024 POSTOP FOLLOW-UP VISIT: CPT

## 2022-10-18 NOTE — PROCEDURE
[Debridement] : debridement  [Bilateral] : bilateral debridement of the nasal cavity [Severe] : severe [Igor] : on both sides [Removed] : which was removed

## 2022-10-18 NOTE — HISTORY OF PRESENT ILLNESS
[de-identified] : 57 years old male patient with history of Status post Open SMR,  Functional endoscopic sinus surgery.  Patient is present today in the office for nasal debridement.  Bacitracin ointment was inserted into patient nostrils for lubricant .  Patient is healing according to plan.

## 2022-10-18 NOTE — REASON FOR VISIT
[Post-Operative Visit] : a post-operative visit [FreeTextEntry2] : Status post Open SMR,  Functional endoscopic sinus surgery

## 2022-10-18 NOTE — PHYSICAL EXAM
[Normal] : no rashes [de-identified] : Lt Otitis Media [de-identified] :  Right side Otitis media / Middle ear infection .  Deviated Nasal Septum.  Turbinate Hypertrophy SINUSITIS [de-identified] : deviated nasal septum.  Reflux laryngitis.  Purulent post nasal drip

## 2022-10-26 ENCOUNTER — APPOINTMENT (OUTPATIENT)
Dept: OTOLARYNGOLOGY | Facility: CLINIC | Age: 57
End: 2022-10-26

## 2022-10-26 VITALS
TEMPERATURE: 97.2 F | HEIGHT: 69 IN | WEIGHT: 170 LBS | SYSTOLIC BLOOD PRESSURE: 118 MMHG | DIASTOLIC BLOOD PRESSURE: 77 MMHG | HEART RATE: 71 BPM | RESPIRATION RATE: 16 BRPM | OXYGEN SATURATION: 97 % | BODY MASS INDEX: 25.18 KG/M2

## 2022-10-26 DIAGNOSIS — J32.0 CHRONIC MAXILLARY SINUSITIS: ICD-10-CM

## 2022-10-26 DIAGNOSIS — H66.91 OTITIS MEDIA, UNSPECIFIED, RIGHT EAR: ICD-10-CM

## 2022-10-26 PROCEDURE — 31237 NSL/SINS NDSC SURG BX POLYPC: CPT | Mod: 50,58

## 2022-10-26 PROCEDURE — 99024 POSTOP FOLLOW-UP VISIT: CPT

## 2022-10-26 RX ORDER — ACETAMINOPHEN AND CODEINE 300; 30 MG/1; MG/1
300-30 TABLET ORAL
Qty: 30 | Refills: 0 | Status: COMPLETED | COMMUNITY
Start: 2022-10-10 | End: 2022-10-26

## 2022-10-26 RX ORDER — EFINACONAZOLE 100 MG/ML
10 SOLUTION TOPICAL
Qty: 4 | Refills: 0 | Status: ACTIVE | COMMUNITY
Start: 2022-02-18

## 2022-10-26 RX ORDER — CLINDAMYCIN HYDROCHLORIDE 300 MG/1
300 CAPSULE ORAL
Qty: 21 | Refills: 0 | Status: ACTIVE | COMMUNITY
Start: 2022-06-07

## 2022-10-26 RX ORDER — ALPRAZOLAM 0.5 MG/1
0.5 TABLET ORAL
Qty: 90 | Refills: 0 | Status: ACTIVE | COMMUNITY
Start: 2022-09-26

## 2022-10-26 RX ORDER — BROMPHENIRAMINE MALEATE, PSEUDOEPHEDRINE HYDROCHLORIDE, 2; 30; 10 MG/5ML; MG/5ML; MG/5ML
30-2-10 SYRUP ORAL
Qty: 240 | Refills: 0 | Status: ACTIVE | COMMUNITY
Start: 2022-09-11

## 2022-10-26 RX ORDER — CYCLOBENZAPRINE HYDROCHLORIDE 5 MG/1
5 TABLET, FILM COATED ORAL 3 TIMES DAILY
Qty: 20 | Refills: 0 | Status: COMPLETED | COMMUNITY
Start: 2020-03-05 | End: 2022-10-26

## 2022-10-26 RX ORDER — CIPROFLOXACIN HYDROCHLORIDE 500 MG/1
500 TABLET, FILM COATED ORAL
Qty: 6 | Refills: 0 | Status: ACTIVE | COMMUNITY
Start: 2022-05-07

## 2022-10-26 NOTE — HISTORY OF PRESENT ILLNESS
[de-identified] : 57 years old male patient with history of Status post Open SMR,  Functional endoscopic sinus surgery.  Patient is present today in the office for nasal debridement.  Bacitracin ointment was inserted into patient nostrils for lubrication.  Patient is healing according to plan.

## 2022-10-26 NOTE — PHYSICAL EXAM
[Normal] : no rashes [de-identified] : Lt Otitis Media [de-identified] :  Right side Otitis media / Middle ear infection .  Deviated Nasal Septum.  Turbinate Hypertrophy SINUSITIS [de-identified] : deviated nasal septum.  Reflux laryngitis.  Purulent post nasal drip

## 2022-10-26 NOTE — REVIEW OF SYSTEMS
[Patient Intake Form Reviewed] : Patient intake form was reviewed [Ear Pain] : ear pain [As Noted in HPI] : as noted in HPI [Nasal Congestion] : nasal congestion [Negative] : Heme/Lymph [de-identified] : Right ear congestion

## 2022-10-28 LAB — SURGICAL PATHOLOGY STUDY: SIGNIFICANT CHANGE UP

## 2022-11-02 NOTE — ED ADULT TRIAGE NOTE - AS TEMP SITE
[Well Developed] : well developed [Well Nourished] : well nourished [No Acute Distress] : no acute distress [Normal Conjunctiva] : normal conjunctiva [Normal Venous Pressure] : normal venous pressure [No Carotid Bruit] : no carotid bruit [Normal S1, S2] : normal S1, S2 [No Murmur] : no murmur [No Rub] : no rub [No Gallop] : no gallop [Clear Lung Fields] : clear lung fields [Good Air Entry] : good air entry [No Respiratory Distress] : no respiratory distress  [Soft] : abdomen soft [Non Tender] : non-tender [No Masses/organomegaly] : no masses/organomegaly [Normal Bowel Sounds] : normal bowel sounds [Normal Gait] : normal gait [No Edema] : no edema [No Cyanosis] : no cyanosis [No Clubbing] : no clubbing [No Varicosities] : no varicosities [No Rash] : no rash [No Skin Lesions] : no skin lesions [Moves all extremities] : moves all extremities oral [No Focal Deficits] : no focal deficits [Normal Speech] : normal speech [Alert and Oriented] : alert and oriented [Normal memory] : normal memory

## 2022-11-16 ENCOUNTER — APPOINTMENT (OUTPATIENT)
Dept: OTOLARYNGOLOGY | Facility: CLINIC | Age: 57
End: 2022-11-16

## 2022-12-01 ENCOUNTER — APPOINTMENT (OUTPATIENT)
Dept: OTOLARYNGOLOGY | Facility: CLINIC | Age: 57
End: 2022-12-01

## 2022-12-01 DIAGNOSIS — J31.0 CHRONIC RHINITIS: ICD-10-CM

## 2022-12-01 DIAGNOSIS — J32.8 OTHER CHRONIC SINUSITIS: ICD-10-CM

## 2022-12-01 PROCEDURE — 99024 POSTOP FOLLOW-UP VISIT: CPT

## 2022-12-01 NOTE — PHYSICAL EXAM
[Normal] : no rashes [de-identified] : Lt Otitis Media [de-identified] :  Right side Otitis media / Middle ear infection .  Deviated Nasal Septum.  Turbinate Hypertrophy SINUSITIS [de-identified] : deviated nasal septum.  Reflux laryngitis.  Purulent post nasal drip

## 2022-12-01 NOTE — REVIEW OF SYSTEMS
[Patient Intake Form Reviewed] : Patient intake form was reviewed [Ear Pain] : ear pain [As Noted in HPI] : as noted in HPI [Nasal Congestion] : nasal congestion [Negative] : Heme/Lymph [de-identified] : Right ear congestion

## 2022-12-01 NOTE — HISTORY OF PRESENT ILLNESS
[de-identified] : 57 years old male patient with history of Status post Open SMR,  Functional endoscopic sinus surgery.  Patient is present today in the office for nasal debridement.  Patient is healing according to plan.

## 2022-12-01 NOTE — PROCEDURE
[Debridement] : debridement  [Bilateral] : bilateral debridement of the nasal cavity [Moderate] : moderate [Igor] : on both sides [Removed] : which was removed

## 2023-04-11 ENCOUNTER — APPOINTMENT (OUTPATIENT)
Dept: OTOLARYNGOLOGY | Facility: CLINIC | Age: 58
End: 2023-04-11
Payer: COMMERCIAL

## 2023-04-11 VITALS
WEIGHT: 170 LBS | HEIGHT: 69 IN | TEMPERATURE: 98 F | HEART RATE: 89 BPM | OXYGEN SATURATION: 98 % | DIASTOLIC BLOOD PRESSURE: 91 MMHG | BODY MASS INDEX: 25.18 KG/M2 | SYSTOLIC BLOOD PRESSURE: 149 MMHG

## 2023-04-11 PROCEDURE — 99213 OFFICE O/P EST LOW 20 MIN: CPT | Mod: 25

## 2023-04-11 PROCEDURE — 99214 OFFICE O/P EST MOD 30 MIN: CPT | Mod: 25

## 2023-04-11 PROCEDURE — 31231 NASAL ENDOSCOPY DX: CPT

## 2023-04-11 RX ORDER — AZITHROMYCIN 500 MG/1
500 TABLET, FILM COATED ORAL DAILY
Qty: 2 | Refills: 0 | Status: ACTIVE | COMMUNITY
Start: 2023-04-11 | End: 1900-01-01

## 2023-04-11 NOTE — PHYSICAL EXAM
[de-identified] : Lt Otitis Media [de-identified] :  Right side Otitis media / Middle ear infection .  Deviated Nasal Septum.  Turbinate Hypertrophy SINUSITIS [de-identified] : deviated nasal septum.  Reflux laryngitis.  Purulent post nasal drip

## 2023-04-17 LAB — BACTERIA NOSE AEROBE CULT: NORMAL

## 2023-08-05 NOTE — ED ADULT TRIAGE NOTE - HEIGHT IN FEET
Physical Therapy      Patient Name:  Mariann Huff   MRN:  [id number]    Patient not seen today secondary to Testing/imaging (xray/CT/MRI). Will follow-up when appropriate.    
5

## 2023-08-16 ENCOUNTER — APPOINTMENT (OUTPATIENT)
Dept: OTOLARYNGOLOGY | Facility: CLINIC | Age: 58
End: 2023-08-16
Payer: COMMERCIAL

## 2023-08-16 VITALS
BODY MASS INDEX: 28.88 KG/M2 | HEIGHT: 69 IN | DIASTOLIC BLOOD PRESSURE: 81 MMHG | WEIGHT: 195 LBS | OXYGEN SATURATION: 98 % | TEMPERATURE: 98 F | SYSTOLIC BLOOD PRESSURE: 134 MMHG | HEART RATE: 74 BPM

## 2023-08-16 DIAGNOSIS — J34.2 DEVIATED NASAL SEPTUM: ICD-10-CM

## 2023-08-16 DIAGNOSIS — R09.81 NASAL CONGESTION: ICD-10-CM

## 2023-08-16 DIAGNOSIS — J31.0 CHRONIC RHINITIS: ICD-10-CM

## 2023-08-16 PROCEDURE — 99213 OFFICE O/P EST LOW 20 MIN: CPT

## 2023-08-16 NOTE — PHYSICAL EXAM
[Normal] : no rashes [de-identified] : Lt Otitis Media [de-identified] :  Right side Otitis media / Middle ear infection .  Deviated Nasal Septum.  Turbinate Hypertrophy SINUSITIS [de-identified] : deviated nasal septum.  Reflux laryngitis.  Purulent post nasal drip

## 2023-08-16 NOTE — REASON FOR VISIT
[Subsequent Evaluation] : a subsequent evaluation for [FreeTextEntry2] : Persistent nasal congestion for the past couple of months.

## 2023-08-16 NOTE — HISTORY OF PRESENT ILLNESS
[de-identified] : 57 years old male patient with history of Status post Open SMR, Functional endoscopic sinus surgery.  Patient is present today in the office   with C/O Persistent nasal congestion for the past couple of months. Vasomotor rhinitis.  Rhinitis

## 2023-08-16 NOTE — REVIEW OF SYSTEMS
[Patient Intake Form Reviewed] : Patient intake form was reviewed [Ear Pain] : ear pain [As Noted in HPI] : as noted in HPI [Nasal Congestion] : nasal congestion [Negative] : Heme/Lymph [de-identified] : Right ear congestion

## 2023-08-17 ENCOUNTER — EMERGENCY (EMERGENCY)
Facility: HOSPITAL | Age: 58
LOS: 1 days | Discharge: HOME | End: 2023-08-17
Attending: EMERGENCY MEDICINE | Admitting: EMERGENCY MEDICINE
Payer: COMMERCIAL

## 2023-08-17 VITALS
DIASTOLIC BLOOD PRESSURE: 97 MMHG | TEMPERATURE: 98 F | RESPIRATION RATE: 18 BRPM | HEIGHT: 69 IN | WEIGHT: 179.9 LBS | SYSTOLIC BLOOD PRESSURE: 159 MMHG | HEART RATE: 81 BPM | OXYGEN SATURATION: 99 %

## 2023-08-17 VITALS — DIASTOLIC BLOOD PRESSURE: 78 MMHG | HEART RATE: 80 BPM | TEMPERATURE: 98 F | SYSTOLIC BLOOD PRESSURE: 138 MMHG

## 2023-08-17 DIAGNOSIS — B20 HUMAN IMMUNODEFICIENCY VIRUS [HIV] DISEASE: ICD-10-CM

## 2023-08-17 DIAGNOSIS — R10.32 LEFT LOWER QUADRANT PAIN: ICD-10-CM

## 2023-08-17 DIAGNOSIS — Z86.010 PERSONAL HISTORY OF COLONIC POLYPS: ICD-10-CM

## 2023-08-17 DIAGNOSIS — K63.89 OTHER SPECIFIED DISEASES OF INTESTINE: ICD-10-CM

## 2023-08-17 DIAGNOSIS — N40.0 BENIGN PROSTATIC HYPERPLASIA WITHOUT LOWER URINARY TRACT SYMPTOMS: ICD-10-CM

## 2023-08-17 DIAGNOSIS — Z96.619 PRESENCE OF UNSPECIFIED ARTIFICIAL SHOULDER JOINT: Chronic | ICD-10-CM

## 2023-08-17 DIAGNOSIS — E78.5 HYPERLIPIDEMIA, UNSPECIFIED: ICD-10-CM

## 2023-08-17 DIAGNOSIS — F41.9 ANXIETY DISORDER, UNSPECIFIED: ICD-10-CM

## 2023-08-17 DIAGNOSIS — E78.00 PURE HYPERCHOLESTEROLEMIA, UNSPECIFIED: ICD-10-CM

## 2023-08-17 DIAGNOSIS — Z87.19 PERSONAL HISTORY OF OTHER DISEASES OF THE DIGESTIVE SYSTEM: ICD-10-CM

## 2023-08-17 DIAGNOSIS — Z88.0 ALLERGY STATUS TO PENICILLIN: ICD-10-CM

## 2023-08-17 DIAGNOSIS — Z96.619 PRESENCE OF UNSPECIFIED ARTIFICIAL SHOULDER JOINT: ICD-10-CM

## 2023-08-17 PROCEDURE — 96361 HYDRATE IV INFUSION ADD-ON: CPT

## 2023-08-17 PROCEDURE — 83690 ASSAY OF LIPASE: CPT

## 2023-08-17 PROCEDURE — 99285 EMERGENCY DEPT VISIT HI MDM: CPT | Mod: 25

## 2023-08-17 PROCEDURE — 96374 THER/PROPH/DIAG INJ IV PUSH: CPT | Mod: XU

## 2023-08-17 PROCEDURE — 85025 COMPLETE CBC W/AUTO DIFF WBC: CPT

## 2023-08-17 PROCEDURE — 74177 CT ABD & PELVIS W/CONTRAST: CPT | Mod: MA

## 2023-08-17 PROCEDURE — 80053 COMPREHEN METABOLIC PANEL: CPT

## 2023-08-17 PROCEDURE — 93005 ELECTROCARDIOGRAM TRACING: CPT

## 2023-08-17 PROCEDURE — 36415 COLL VENOUS BLD VENIPUNCTURE: CPT

## 2023-08-17 PROCEDURE — 96375 TX/PRO/DX INJ NEW DRUG ADDON: CPT

## 2023-08-17 PROCEDURE — 74177 CT ABD & PELVIS W/CONTRAST: CPT | Mod: 26,MA

## 2023-08-17 PROCEDURE — 99285 EMERGENCY DEPT VISIT HI MDM: CPT

## 2023-08-17 RX ORDER — IOHEXOL 300 MG/ML
30 INJECTION, SOLUTION INTRAVENOUS ONCE
Refills: 0 | Status: COMPLETED | OUTPATIENT
Start: 2023-08-17 | End: 2023-08-17

## 2023-08-17 RX ORDER — SODIUM CHLORIDE 9 MG/ML
1000 INJECTION INTRAMUSCULAR; INTRAVENOUS; SUBCUTANEOUS ONCE
Refills: 0 | Status: COMPLETED | OUTPATIENT
Start: 2023-08-17 | End: 2023-08-17

## 2023-08-17 RX ORDER — ONDANSETRON 8 MG/1
4 TABLET, FILM COATED ORAL ONCE
Refills: 0 | Status: COMPLETED | OUTPATIENT
Start: 2023-08-17 | End: 2023-08-17

## 2023-08-17 RX ORDER — MORPHINE SULFATE 50 MG/1
4 CAPSULE, EXTENDED RELEASE ORAL ONCE
Refills: 0 | Status: DISCONTINUED | OUTPATIENT
Start: 2023-08-17 | End: 2023-08-17

## 2023-08-17 RX ADMIN — IOHEXOL 30 MILLILITER(S): 300 INJECTION, SOLUTION INTRAVENOUS at 19:58

## 2023-08-17 RX ADMIN — SODIUM CHLORIDE 1000 MILLILITER(S): 9 INJECTION INTRAMUSCULAR; INTRAVENOUS; SUBCUTANEOUS at 21:37

## 2023-08-17 RX ADMIN — SODIUM CHLORIDE 1000 MILLILITER(S): 9 INJECTION INTRAMUSCULAR; INTRAVENOUS; SUBCUTANEOUS at 19:59

## 2023-08-17 RX ADMIN — MORPHINE SULFATE 4 MILLIGRAM(S): 50 CAPSULE, EXTENDED RELEASE ORAL at 21:37

## 2023-08-17 RX ADMIN — MORPHINE SULFATE 4 MILLIGRAM(S): 50 CAPSULE, EXTENDED RELEASE ORAL at 19:59

## 2023-08-17 RX ADMIN — ONDANSETRON 4 MILLIGRAM(S): 8 TABLET, FILM COATED ORAL at 19:58

## 2023-08-17 NOTE — ED ADULT NURSE REASSESSMENT NOTE - NS ED NURSE REASSESS COMMENT FT1
patient a/ox 3, adminsitered Morphine 4mg IVP, zofran 4mg IVP for pain symptoms.  NSS 1 L bolus ongoing.  Vital signs stable.  Oral contrast ongoing.  NPO .  CT scan pending.

## 2023-08-17 NOTE — ED ADULT NURSE NOTE - OBJECTIVE STATEMENT
patient w/ Hx of HLD, BPH, HIV on medications, c/o of 4 days diffucse left sided abdominal cramping pain, no nausea/vomitting, no diarrhea, no urinary symptoms.  EKG NSR in ED.

## 2023-08-17 NOTE — ED ADULT NURSE REASSESSMENT NOTE - NS ED NURSE REASSESS COMMENT FT1
patient a/oX 4, anxious, c/o of intermittent generalized abdominal pain, no nausea/vomitting/diarrhea.  Vital signs stable.  Left FA PIV #20 ini place, all labs sent,no complications.  NPO observed.  MD evaluation pending.  Stable and comfortable.

## 2023-08-17 NOTE — ED PROVIDER NOTE - NSFOLLOWUPINSTRUCTIONS_ED_ALL_ED_FT
You were evaluated in the ED for abdominal pain.  Your blood work and urinalysis showed no acute abnormalities. You had a CT of the abdomen and pelvis with the preliminary results of epiploic appendagitis (inflammation fo the fat pouches outside the colon). This is treated conservatively with NSAIDs (Motrin, Advil, Ibuprofen, Aleve, or Naprosyn) -- choose one and take with food, and typically resolves without further treatment. Follow up with your regular medical doctor. Return for severe pain, fever, vomiting, bloody stools, or other concerning symptoms. You will be contacted if the final CT reports changes. The results will be also available for your viewing in the patient portal.     Epiploic Appendagitis    Epiploic appendagitis (EA) is swelling and irritation of pouches that are attached to the end of the large intestine or colon. These pouches are called epiploic appendages. These pouches contain fat and are attached to the outside of the colon. This condition causes sudden pain in the lower abdomen.    EA is rare, and it usually goes away on its own. It can feel like other abdominal conditions, such as appendicitis, gallbladder attack, or diverticulitis.    What are the causes?  This condition may be caused by:  Blocked blood flow due to a blood clot.  Twisting (torsion) of the epiploic appendages.  Conditions that cause swelling and inflammation of nearby tissue, such as long-term (chronic) diarrhea, Crohn's disease, or ulcerative colitis.  What increases the risk?  You are more likely to develop this condition if:  You are 40–50 years old.  You are male.  You are overweight.  You eat large meals.  What are the signs or symptoms?  The most common symptom of this condition is lower abdominal pain that starts suddenly and can be severe. Pain can be anywhere in the lower abdomen, but it is more common on the left side. The pain may get worse with movement or when pressing on your abdomen.    The following symptoms are possible, but they are not common:  Fever.  Nausea or vomiting.  Diarrhea or constipation.  How is this diagnosed?  Your health care provider may suspect EA if you have sudden lower abdominal pain without other symptoms. The condition may be diagnosed based on:  CT scan. This is the best way to diagnose EA.  Your symptoms.  A physical exam.  Ultrasound.  A blood test (complete blood count, CBC).  A procedure to check your abdomen using a scope that has a camera (laparoscopy). This may be done to confirm the diagnosis.  How is this treated?  EA usually goes away without treatment. Your health care provider may recommend NSAIDs to reduce pain and inflammation. NSAIDs include aspirin or ibuprofen. In rare cases, you may need surgery to remove the appendages. Surgery is needed if the condition does not improve with other treatments or if it keeps coming back.    Follow these instructions at home:  Take over-the-counter and prescription medicines only as told by your health care provider.  You may need to avoid strenuous exercise. Ask your health care provider what activities are safe for you.  Keep all follow-up visits. This is important.  How is this prevented?  Practice portion control. Eating large meals can put pressure on the intestines, which can lead to epiploic appendagitis.  Maintain a healthy weight.  Contact a health care provider if:  You have a fever or chills.  You develop nausea, vomiting, diarrhea, or constipation.  Your pain gets worse.  Your pain lasts longer than 10 days.  Get help right away if:  You have severe pain that does not get better after you take medicine.  Summary  Epiploic appendagitis (EA) is swelling and irritation of pouches (epiploic appendages) that are attached to the outside of the colon. The colon is the end portion of the large intestine.  EA can feel like other abdominal conditions, such as appendicitis, a gallbladder attack, or diverticulitis.  EA usually goes away without treatment. Your health care provider may recommend NSAIDs to reduce pain and inflammation.  This information is not intended to replace advice given to you by your health care provider. Make sure you discuss any questions you have with your health care provider.    Abdominal Pain    Many things can cause abdominal pain. Many times, abdominal pain is not caused by a disease and will improve without treatment. Your health care provider will do a physical exam to determine if there is a dangerous cause of your pain; blood tests and imaging may help determine the cause of your pain. However, in many cases, no cause may be found and you may need further testing as an outpatient. Monitor your abdominal pain for any changes.     SEEK IMMEDIATE MEDICAL CARE IF YOU HAVE ANY OF THE FOLLOWING SYMPTOMS: worsening abdominal pain, uncontrollable vomiting, profuse diarrhea, inability to have bowel movements or pass gas, black or bloody stools, fever accompanying chest pain or back pain, or fainting. These symptoms may represent a serious problem that is an emergency. Do not wait to see if the symptoms will go away. Get medical help right away. Call 911 and do not drive yourself to the hospital.

## 2023-08-17 NOTE — ED PROVIDER NOTE - PHYSICAL EXAMINATION
Constitutional: Well appearing, awake, alert, oriented to person, place, time/situation and in no apparent distress.  ENMT: Airway patent. Normal MM  Eyes: Clear bilaterally  Cardiac: Normal rate, regular rhythm.  Heart sounds S1, S2.  No murmurs, rubs or gallops.  Respiratory: Breaths sounds equal and clear b/l. No increased WOB, tachypnea, hypoxia, or accessory mm use. Pt speaks in full sentences.   Gastrointestinal: Abd soft, + LLQ, ND, NABS. No guarding, rebound, or rigidity. No pulsatile abdominal masses. No organomegaly appreciated. No groin swelling  Musculoskeletal: Range of motion is not limited  Neuro: Alert and oriented x 3, face symmetric and speech fluent. Strength 5/5 x 4 ext and symmetric, nml gross motor movement, nml gait. No focal deficits noted.  Skin: Skin normal color for race, warm, dry and intact. No evidence of rash.  Psych: Alert and oriented to person, place, time/situation. normal mood and affect. no apparent risk to self or others.

## 2023-08-17 NOTE — ED ADULT NURSE NOTE - NSFALLUNIVINTERV_ED_ALL_ED
Bed/Stretcher in lowest position, wheels locked, appropriate side rails in place/Call bell, personal items and telephone in reach/Instruct patient to call for assistance before getting out of bed/chair/stretcher/Non-slip footwear applied when patient is off stretcher/[place] to call system/Physically safe environment - no spills, clutter or unnecessary equipment/Purposeful proactive rounding/Room/bathroom lighting operational, light cord in reach

## 2023-08-17 NOTE — ED PROVIDER NOTE - CLINICAL SUMMARY MEDICAL DECISION MAKING FREE TEXT BOX
LLQ abd pain. DDx includes but not limited to diverticulitis, colitis, less likely ureteral colic (no flank/groin pain, nor urinary sx), pyelo, UTI, other pathology. NPO, IVF, analgesia. Check labs, CT, UA. Dispo pending w/u and clinical status

## 2023-08-17 NOTE — ED PROVIDER NOTE - PROGRESS NOTE DETAILS
---------------------  From: Kelly Lilly MD   [id number]     Sent: 10/16/2021 10:50:30 AM CDT  Subject: scoliosis film result     Please let family know Dalia scoliosis film is okay.  I usually get worried if angles are greater than 20 degrees of curvature; hers are well below that at 11 and 6.  I will recheck her clinically at her next well child- would want family to come back sooner for repeat exam if they noticed a change in symmetry of her hips, scapula, rib cage. [facility].  
Peripheral Block    Start time: 7/22/2019 1:27 PM  End time: 7/22/2019 1:32 PM  Performed by: Edgardo Salinas MD  Authorized by: Edgardo Salinas MD       Pre-procedure:    Indications: at surgeon's request and post-op pain management    Preanesthetic Checklist: patient identified, risks and benefits discussed, site marked, timeout performed, anesthesia consent given and patient being monitored    Timeout Time: 13:26          Block Type:   Block Type:  Supraclavicular  Laterality:  Left  Monitoring:  Standard ASA monitoring, continuous pulse ox, frequent vital sign checks, heart rate, responsive to questions and oxygen  Injection Technique:  Single shot  Procedures: ultrasound guided and nerve stimulator    Patient Position: seated  Prep: chlorhexidine    Location:  Supraclavicular  Needle Type:  Stimuplex  Needle Localization:  Nerve stimulator and ultrasound guidance  Motor Response: minimal motor response >0.4 mA      Assessment:  Number of attempts:  1  Injection Assessment:  Incremental injection every 5 mL, local visualized surrounding nerve on ultrasound, negative aspiration for CSF, negative aspiration for blood, no paresthesia, no intravascular symptoms and ultrasound image on chart  Patient tolerance:  Patient tolerated the procedure well with no immediate complications
D/w pt prelim results of epiploic appendagitis, supportive care. Pt is aware these results are preliminary and final is pending. Pt is feeling better and comfortable going home. F/u PCP, return precautions

## 2023-08-17 NOTE — ED PROVIDER NOTE - PATIENT PORTAL LINK FT
You can access the FollowMyHealth Patient Portal offered by [facility] by registering at the following website: [email or web address]. By joining [employer]’s FollowMyHealth portal, you will also be able to view your health information using other applications (apps) compatible with our system.

## 2023-08-17 NOTE — ED PROVIDER NOTE - OBJECTIVE STATEMENT
Pt w/ PMHx HIV on Biktarvy (CD4 900s, undetectable VL), HLD, BPH, anxiety, PSHx b/l inguinal hernia repair w/ mesh 30 years ago, now p/w abd pain x 4 days. Initially the pain was diffuse lower, cramping, intermittent. Had 2 large BMs, yesterday and the day before w/o relief. NO f/c. No n/v. No F/u/D or hematuria. No groin pain / swelling/ testicular pain. Last C-scope 8 months ago, benign polyp removed. No known hx diverticular disease. Saw GI today, had LLQ ttp, referred to the ED

## 2023-08-17 NOTE — ED ADULT NURSE NOTE - NS PRO PASSIVE SMOKE EXP
I have not seen him since May last year    Is still planning to follow-up with us
I scheduled patient for Monday next week   Patient was made aware of new location
No

## 2024-01-30 NOTE — ED PROVIDER NOTE - CARE PLAN
From: Alexis Conye  To: Tristan Williamson  Sent: 1/30/2024 3:49 AM CST  Subject: Increase of pain.     This morning I woke up with a significant increase of pain. Areas hurt to the touch. Even a sock became extremely painful, just about anything touching the area cause a shooting pain. Walking has gotten worse. I’m not sure if the spike in pain could be related to walking in the house without the boot or surgical shoe.    Before this I was minor pain. I was taking 1, 5mg Oxycodone a day. It was usually taken at the end of the day after physical activity. This morning I woke up in a good amount of pain that only progressed as the day went on. It started off as a throbbing/shooting pain but significantly worsened as the day went on. I ended up taking 4 pain pills and was quite worried in this sudden change of severe pain. But the end of the day the pain was so bad my body became tense I was noticing I was clenching my teeth. I did try ice pack. It was difficult as even an pressure has become painful.     I looked over the foot it doesn’t have any unusual skin issues. The cut itself looks like it’s healing appropriately scabbing over, skin peeling off. I have had a flare up of periodic fever syndrome with my highest fever around 100. I’m not sure if this could be causing the issues with the pain.     I will need a refill of pain medication. I currently have 6 pills left but with this sudden increase of pain I will need more pain medication.   
Principal Discharge DX:	Acute prostatitis

## 2024-05-30 ENCOUNTER — APPOINTMENT (OUTPATIENT)
Dept: OTOLARYNGOLOGY | Facility: CLINIC | Age: 59
End: 2024-05-30
Payer: COMMERCIAL

## 2024-05-30 VITALS
HEIGHT: 70 IN | BODY MASS INDEX: 25.77 KG/M2 | HEART RATE: 61 BPM | OXYGEN SATURATION: 97 % | WEIGHT: 180 LBS | SYSTOLIC BLOOD PRESSURE: 121 MMHG | TEMPERATURE: 98 F | DIASTOLIC BLOOD PRESSURE: 76 MMHG

## 2024-05-30 PROCEDURE — 31575 DIAGNOSTIC LARYNGOSCOPY: CPT

## 2024-05-30 PROCEDURE — 99213 OFFICE O/P EST LOW 20 MIN: CPT | Mod: 25

## 2024-05-30 RX ORDER — METHYLPREDNISOLONE 4 MG/1
4 TABLET ORAL
Qty: 1 | Refills: 0 | Status: ACTIVE | COMMUNITY
Start: 2024-05-30 | End: 1900-01-01

## 2024-05-30 RX ORDER — OMEPRAZOLE 40 MG/1
40 CAPSULE, DELAYED RELEASE ORAL TWICE DAILY
Qty: 60 | Refills: 0 | Status: ACTIVE | COMMUNITY
Start: 2024-05-30 | End: 1900-01-01
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
## 2024-06-19 NOTE — HISTORY OF PRESENT ILLNESS
[de-identified] :  60 y/o M presents to the office with C/O hoarseness of voice after 2 ep of nausea associated with acid reflux 36 hours ago.   severe voice loss and  sp reflux injury Medrol omeprazol

## 2024-06-19 NOTE — REASON FOR VISIT
[Subsequent Evaluation] : a subsequent evaluation for [Hoarseness/Dysphonia] : hoarseness [FreeTextEntry2] : severe voice loss and  sp reflux injury

## 2024-06-19 NOTE — PROCEDURE
[Topical Lidocaine] : topical lidocaine [Oxymetazoline HCl] : oxymetazoline HCl [Flexible Endoscope] : examined with the flexible endoscope [Serial Number: ___] : Serial Number: [unfilled] [de-identified] : Acute laryngitis edema inflammation and redneness of the vocal cords.

## 2024-07-03 ENCOUNTER — APPOINTMENT (OUTPATIENT)
Dept: UROLOGY | Facility: CLINIC | Age: 59
End: 2024-07-03

## 2024-07-09 ENCOUNTER — NON-APPOINTMENT (OUTPATIENT)
Age: 59
End: 2024-07-09

## 2024-07-09 ENCOUNTER — APPOINTMENT (OUTPATIENT)
Dept: UROLOGY | Facility: CLINIC | Age: 59
End: 2024-07-09
Payer: COMMERCIAL

## 2024-07-09 VITALS
DIASTOLIC BLOOD PRESSURE: 67 MMHG | TEMPERATURE: 98.7 F | BODY MASS INDEX: 25.77 KG/M2 | HEIGHT: 70 IN | SYSTOLIC BLOOD PRESSURE: 127 MMHG | HEART RATE: 67 BPM | WEIGHT: 180 LBS

## 2024-07-09 DIAGNOSIS — N40.0 BENIGN PROSTATIC HYPERPLASIA WITHOUT LOWER URINARY TRACT SYMPMS: ICD-10-CM

## 2024-07-09 PROCEDURE — 99214 OFFICE O/P EST MOD 30 MIN: CPT

## 2024-07-09 PROCEDURE — G2211 COMPLEX E/M VISIT ADD ON: CPT

## 2024-07-09 PROCEDURE — 51798 US URINE CAPACITY MEASURE: CPT

## 2024-07-10 LAB
APPEARANCE: CLEAR
BILIRUBIN URINE: NEGATIVE
BLOOD URINE: NEGATIVE
COLOR: YELLOW
GLUCOSE QUALITATIVE U: NEGATIVE MG/DL
KETONES URINE: NEGATIVE MG/DL
NITRITE URINE: NEGATIVE
PROTEIN URINE: NEGATIVE MG/DL
SPECIFIC GRAVITY URINE: 1.02
UROBILINOGEN URINE: 0.2 MG/DL

## 2024-07-11 LAB
BACTERIA UR CULT: NORMAL
ESTRADIOL SERPL-MCNC: 22 PG/ML
LH SERPL-ACNC: <0.3 IU/L
TESTOST SERPL-MCNC: 656 NG/DL

## 2024-08-02 ENCOUNTER — APPOINTMENT (OUTPATIENT)
Dept: HEART AND VASCULAR | Facility: CLINIC | Age: 59
End: 2024-08-02
Payer: COMMERCIAL

## 2024-08-02 ENCOUNTER — NON-APPOINTMENT (OUTPATIENT)
Age: 59
End: 2024-08-02

## 2024-08-02 VITALS
HEART RATE: 67 BPM | OXYGEN SATURATION: 95 % | WEIGHT: 195.01 LBS | BODY MASS INDEX: 27.92 KG/M2 | TEMPERATURE: 98.3 F | SYSTOLIC BLOOD PRESSURE: 112 MMHG | HEIGHT: 70 IN | RESPIRATION RATE: 16 BRPM | DIASTOLIC BLOOD PRESSURE: 70 MMHG

## 2024-08-02 DIAGNOSIS — J04.0 ACUTE LARYNGITIS: ICD-10-CM

## 2024-08-02 DIAGNOSIS — R10.2 PELVIC AND PERINEAL PAIN: ICD-10-CM

## 2024-08-02 DIAGNOSIS — M79.18 MYALGIA, OTHER SITE: ICD-10-CM

## 2024-08-02 DIAGNOSIS — H93.13 TINNITUS, BILATERAL: ICD-10-CM

## 2024-08-02 DIAGNOSIS — F41.9 ANXIETY DISORDER, UNSPECIFIED: ICD-10-CM

## 2024-08-02 DIAGNOSIS — R93.1 ABNORMAL FINDINGS ON DIAGNOSTIC IMAGING OF HEART AND CORONARY CIRCULATION: ICD-10-CM

## 2024-08-02 DIAGNOSIS — Z87.898 PERSONAL HISTORY OF OTHER SPECIFIED CONDITIONS: ICD-10-CM

## 2024-08-02 DIAGNOSIS — H66.91 OTITIS MEDIA, UNSPECIFIED, RIGHT EAR: ICD-10-CM

## 2024-08-02 DIAGNOSIS — Z87.09 PERSONAL HISTORY OF OTHER DISEASES OF THE RESPIRATORY SYSTEM: ICD-10-CM

## 2024-08-02 DIAGNOSIS — J32.0 CHRONIC MAXILLARY SINUSITIS: ICD-10-CM

## 2024-08-02 DIAGNOSIS — Z01.818 ENCOUNTER FOR OTHER PREPROCEDURAL EXAMINATION: ICD-10-CM

## 2024-08-02 DIAGNOSIS — K21.9 ACUTE LARYNGITIS: ICD-10-CM

## 2024-08-02 DIAGNOSIS — H92.01 OTALGIA, RIGHT EAR: ICD-10-CM

## 2024-08-02 DIAGNOSIS — Z82.49 FAMILY HISTORY OF ISCHEMIC HEART DISEASE AND OTHER DISEASES OF THE CIRCULATORY SYSTEM: ICD-10-CM

## 2024-08-02 DIAGNOSIS — J31.0 CHRONIC RHINITIS: ICD-10-CM

## 2024-08-02 DIAGNOSIS — Z78.9 OTHER SPECIFIED HEALTH STATUS: ICD-10-CM

## 2024-08-02 DIAGNOSIS — H93.8X1 OTHER SPECIFIED DISORDERS OF RIGHT EAR: ICD-10-CM

## 2024-08-02 DIAGNOSIS — Z21 ASYMPTOMATIC HUMAN IMMUNODEFICIENCY VIRUS [HIV] INFECTION STATUS: ICD-10-CM

## 2024-08-02 DIAGNOSIS — Z87.19 PERSONAL HISTORY OF OTHER DISEASES OF THE DIGESTIVE SYSTEM: ICD-10-CM

## 2024-08-02 DIAGNOSIS — G89.29 PELVIC AND PERINEAL PAIN: ICD-10-CM

## 2024-08-02 DIAGNOSIS — R09.81 NASAL CONGESTION: ICD-10-CM

## 2024-08-02 DIAGNOSIS — Z86.69 PERSONAL HISTORY OF OTHER DISEASES OF THE NERVOUS SYSTEM AND SENSE ORGANS: ICD-10-CM

## 2024-08-02 DIAGNOSIS — M47.816 SPONDYLOSIS W/OUT MYELOPATHY OR RADICULOPATHY, LUMBAR REGION: ICD-10-CM

## 2024-08-02 DIAGNOSIS — M89.8X1 OTHER SPECIFIED DISORDERS OF BONE, SHOULDER: ICD-10-CM

## 2024-08-02 DIAGNOSIS — J32.8 OTHER CHRONIC SINUSITIS: ICD-10-CM

## 2024-08-02 DIAGNOSIS — K21.9 GASTRO-ESOPHAGEAL REFLUX DISEASE W/OUT ESOPHAGITIS: ICD-10-CM

## 2024-08-02 DIAGNOSIS — M54.50 LOW BACK PAIN, UNSPECIFIED: ICD-10-CM

## 2024-08-02 DIAGNOSIS — Z82.3 FAMILY HISTORY OF STROKE: ICD-10-CM

## 2024-08-02 DIAGNOSIS — R06.09 OTHER FORMS OF DYSPNEA: ICD-10-CM

## 2024-08-02 DIAGNOSIS — F32.A ANXIETY DISORDER, UNSPECIFIED: ICD-10-CM

## 2024-08-02 DIAGNOSIS — U07.1 COVID-19: ICD-10-CM

## 2024-08-02 PROCEDURE — 99204 OFFICE O/P NEW MOD 45 MIN: CPT

## 2024-08-02 PROCEDURE — 93000 ELECTROCARDIOGRAM COMPLETE: CPT

## 2024-08-02 PROCEDURE — G2211 COMPLEX E/M VISIT ADD ON: CPT

## 2024-08-02 RX ORDER — CLONAZEPAM 1 MG/1
1 TABLET ORAL
Refills: 0 | Status: ACTIVE | COMMUNITY

## 2024-08-02 RX ORDER — TAMSULOSIN HYDROCHLORIDE 0.4 MG/1
0.4 CAPSULE ORAL
Qty: 60 | Refills: 0 | Status: DISCONTINUED | COMMUNITY
Start: 2024-08-02 | End: 2024-08-02

## 2024-08-02 RX ORDER — ESCITALOPRAM OXALATE 20 MG/1
20 TABLET, FILM COATED ORAL
Refills: 0 | Status: ACTIVE | COMMUNITY

## 2024-08-02 RX ORDER — TRAZODONE HYDROCHLORIDE 50 MG/1
50 TABLET ORAL
Refills: 0 | Status: ACTIVE | COMMUNITY

## 2024-08-02 NOTE — HISTORY OF PRESENT ILLNESS
[FreeTextEntry1] : 59 year male who comes with a calcium score of 123 placing him at 60 percentile. He is exercising and eating a healthy diet with blue apron. He is able to bike 100 miles without symptoms. He perform Pilates, weights and Peloton or treadmill. He notes getting SOB when climbing 5 flights of stairs.

## 2024-08-02 NOTE — ASSESSMENT
[FreeTextEntry1] : An EKG was performed to evaluate for arrhythmia and ischemia.  MEAD, Elevated Calcium score-- I suggested that he undergo a stess-echocardiogram  Hyperlipidemia-- we reviewed labs. I suggested increasing his Rosuvastatin to 20 mg daily. He will discuss with Dr ADAN Barrera  I encouraged continued risk factor reduction and gradual increase in aerobic activity as tolerated

## 2024-08-23 ENCOUNTER — APPOINTMENT (OUTPATIENT)
Dept: UROLOGY | Facility: CLINIC | Age: 59
End: 2024-08-23
Payer: COMMERCIAL

## 2024-08-23 VITALS
BODY MASS INDEX: 27.92 KG/M2 | WEIGHT: 195 LBS | DIASTOLIC BLOOD PRESSURE: 64 MMHG | SYSTOLIC BLOOD PRESSURE: 113 MMHG | HEIGHT: 70 IN | TEMPERATURE: 98.7 F | HEART RATE: 83 BPM

## 2024-08-23 PROCEDURE — G2211 COMPLEX E/M VISIT ADD ON: CPT

## 2024-08-23 PROCEDURE — 99214 OFFICE O/P EST MOD 30 MIN: CPT

## 2024-08-23 RX ORDER — OXYBUTYNIN CHLORIDE 10 MG/1
10 TABLET, EXTENDED RELEASE ORAL
Qty: 90 | Refills: 3 | Status: ACTIVE | COMMUNITY
Start: 2024-08-23 | End: 1900-01-01

## 2024-08-26 NOTE — END OF VISIT
[FreeTextEntry3] : I, Dr. Arroyo, personally performed the evaluation and management (E/M) services for this established patient who presents today with (a) new problem(s)/exacerbation of (an) existing condition(s). That E/M includes conducting the clinically appropriate interval history &/or exam, assessing all new/exacerbated conditions, and establishing a new plan of care. Today, my NELLI, [employer]enny Rayoins, was here to observe my evaluation and management service for this new problem/exacerbated condition and follow the plan of care established by me going forward

## 2024-08-26 NOTE — HISTORY OF PRESENT ILLNESS
[FreeTextEntry1] : 59M presents for follow up for BPH, ED  Last visit 7/9/24 increased flomax from 0.4 mg to 0.8 mg Urine testing 7/9/2024 negative  Urgency not as dramatic, but still has continued urinary frequency, nocturia X2, no dysuria, hematuria, or incontinence Reports having significant life stressors - on antidepressants/anxiolytics LUTS Interferes with sleep quality  PVR 7/2024  last visit to r/o retention : 47 cc  ED: not interested in trying medications at this time  Labs 7/10/24: T 656  LH <0.3 estradiol 22

## 2024-08-26 NOTE — PHYSICAL EXAM
[General Appearance - Well Developed] : well developed [General Appearance - Well Nourished] : well nourished [Urethral Meatus] : meatus normal [Testes Tenderness] : no tenderness of the testes [No Prostate Nodules] : no prostate nodules [de-identified] : moderate tenderness on palpation of prostate, 40 g, burning sensation in prostate s/p HERBERT

## 2024-08-26 NOTE — HISTORY OF PRESENT ILLNESS
Called and left another message     [FreeTextEntry1] : 59M presents for follow up for BPH, ED  Last visit 7/9/24 increased flomax from 0.4 mg to 0.8 mg Urine testing 7/9/2024 negative  Urgency not as dramatic, but still has continued urinary frequency, nocturia X2, no dysuria, hematuria, or incontinence Reports having significant life stressors - on antidepressants/anxiolytics LUTS Interferes with sleep quality  PVR 7/2024  last visit to r/o retention : 47 cc  ED: not interested in trying medications at this time  Labs 7/10/24: T 656  LH <0.3 estradiol 22

## 2024-08-26 NOTE — PHYSICAL EXAM
[General Appearance - Well Developed] : well developed [General Appearance - Well Nourished] : well nourished [Urethral Meatus] : meatus normal [Testes Tenderness] : no tenderness of the testes [No Prostate Nodules] : no prostate nodules [de-identified] : moderate tenderness on palpation of prostate, 40 g, burning sensation in prostate s/p HERBERT

## 2024-08-26 NOTE — END OF VISIT
[FreeTextEntry3] : I, Dr. Arroyo, personally performed the evaluation and management (E/M) services for this established patient who presents today with (a) new problem(s)/exacerbation of (an) existing condition(s). That E/M includes conducting the clinically appropriate interval history &/or exam, assessing all new/exacerbated conditions, and establishing a new plan of care. Today, my NELLI, [employer] Rayoins, was here to observe my evaluation and management service for this new problem/exacerbated condition and follow the plan of care established by me going forward

## 2024-08-26 NOTE — END OF VISIT
[FreeTextEntry3] : I, Dr. Arroyo, personally performed the evaluation and management (E/M) services for this established patient who presents today with (a) new problem(s)/exacerbation of (an) existing condition(s). That E/M includes conducting the clinically appropriate interval history &/or exam, assessing all new/exacerbated conditions, and establishing a new plan of care. Today, my NELLI, [employer] Rayoins, was here to observe my evaluation and management service for this new problem/exacerbated condition and follow the plan of care established by me going forward

## 2024-08-26 NOTE — PHYSICAL EXAM
[General Appearance - Well Developed] : well developed [General Appearance - Well Nourished] : well nourished [Urethral Meatus] : meatus normal [Testes Tenderness] : no tenderness of the testes [No Prostate Nodules] : no prostate nodules [de-identified] : moderate tenderness on palpation of prostate, 40 g, burning sensation in prostate s/p HERBERT

## 2024-08-26 NOTE — PHYSICAL EXAM
[General Appearance - Well Developed] : well developed [General Appearance - Well Nourished] : well nourished [Urethral Meatus] : meatus normal [Testes Tenderness] : no tenderness of the testes [No Prostate Nodules] : no prostate nodules [de-identified] : moderate tenderness on palpation of prostate, 40 g, burning sensation in prostate s/p HERBERT

## 2024-08-26 NOTE — END OF VISIT
[FreeTextEntry3] : I, Dr. Arroyo, personally performed the evaluation and management (E/M) services for this established patient who presents today with (a) new problem(s)/exacerbation of (an) existing condition(s). That E/M includes conducting the clinically appropriate interval history &/or exam, assessing all new/exacerbated conditions, and establishing a new plan of care. Today, my NELLI, [employer] Rayoins, was here to observe my evaluation and management service for this new problem/exacerbated condition and follow the plan of care established by me going forward

## 2024-08-26 NOTE — ASSESSMENT
[FreeTextEntry1] : BPH, ED  T testing WNL not interested in PDE5i at this time continue flomax 0.8 mg  addition of oxybutynin 10 mg XL  return in 4 weeks

## 2024-09-20 ENCOUNTER — APPOINTMENT (OUTPATIENT)
Dept: HEART AND VASCULAR | Facility: CLINIC | Age: 59
End: 2024-09-20
Payer: COMMERCIAL

## 2024-09-20 DIAGNOSIS — R93.1 ABNORMAL FINDINGS ON DIAGNOSTIC IMAGING OF HEART AND CORONARY CIRCULATION: ICD-10-CM

## 2024-09-20 DIAGNOSIS — R06.09 OTHER FORMS OF DYSPNEA: ICD-10-CM

## 2024-09-20 PROCEDURE — 93351 STRESS TTE COMPLETE: CPT

## 2024-09-20 PROCEDURE — 99213 OFFICE O/P EST LOW 20 MIN: CPT | Mod: 25

## 2024-09-20 PROCEDURE — ZZZZZ: CPT | Mod: NC

## 2024-09-20 NOTE — ASSESSMENT
[FreeTextEntry1] : At the time of the patient's visit an Echocardiogram was performed to evaluate LV function. At the time of the visit the results were reviewed with patient  At the time of the patient's visit a Stress Echocardiogram was performed to evaluate for exercise induced arrhythmia and ischemia. At the time of the visit the results were reviewed with patient  I informed him that I did not find a Cardiovascular cause for his symptoms  I encouraged continued risk factor reduction and gradual increase in aerobic activity as tolerated  21   minutes were spent discussing cardiac risk excluding procedure time

## 2024-09-24 ENCOUNTER — APPOINTMENT (OUTPATIENT)
Dept: UROLOGY | Facility: CLINIC | Age: 59
End: 2024-09-24
Payer: COMMERCIAL

## 2024-09-24 VITALS — SYSTOLIC BLOOD PRESSURE: 130 MMHG | HEART RATE: 76 BPM | TEMPERATURE: 97.3 F | DIASTOLIC BLOOD PRESSURE: 80 MMHG

## 2024-09-24 PROCEDURE — 51798 US URINE CAPACITY MEASURE: CPT

## 2024-09-24 PROCEDURE — G2211 COMPLEX E/M VISIT ADD ON: CPT | Mod: NC

## 2024-09-24 PROCEDURE — 99214 OFFICE O/P EST MOD 30 MIN: CPT

## 2024-09-24 RX ORDER — MIRABEGRON 50 MG/1
50 TABLET, EXTENDED RELEASE ORAL
Qty: 90 | Refills: 3 | Status: ACTIVE | COMMUNITY
Start: 2024-09-24 | End: 1900-01-01

## 2024-09-24 NOTE — ASSESSMENT
[FreeTextEntry1] : BPH/OAB PVR to r/o obstruction (9/24/24) : 11 cc  Myrbetriq 50 mg trial, stop oxybutynin continue flomax 0.8 mg daily  If no improvement with Myrbetriq 50 mg, discussed next step would include cystoscopy with possible surgical intervention   ED  T testing WNL 7/2024 defers PDE5i at this time  RTC 3 months

## 2024-09-24 NOTE — HISTORY OF PRESENT ILLNESS
[FreeTextEntry1] : 59M presents for follow up for BPH/OAB ED Last visit 8/23/24 added oxybutynin 10 mg XL to his flomax 0.8 mg regimen   BPH/OAB: Reports mild improvement with oxybutynin 10 mg XL, but reports still urinating multiple times at night (~2-3x), very bothersome to pt.  Limits fluids at 7 PM.  Reports overall urgency is much improved. Sensation to void not as strong, so has to consciously remind himself to void at times  Reports moderate dry mouth, which in turn leads to greater water intake; gets really thirsty when working out with dry mouth (improves with gum chewing)  No dysuria or hematuria.  Denies any pain complaints.   ED: Reports low libido overall.  not interested in trying medications at this time; T Testing WNL 7/11/24. No partner at this time, but open to discussion of PDE5i if he has a new partner in the future.   Labs 7/10/24: T 656 LH <0.3 estradiol 22  Urine testing 7/9/2024 negative

## 2024-10-14 ENCOUNTER — RX RENEWAL (OUTPATIENT)
Age: 59
End: 2024-10-14

## 2024-10-16 ENCOUNTER — APPOINTMENT (OUTPATIENT)
Dept: OTOLARYNGOLOGY | Facility: CLINIC | Age: 59
End: 2024-10-16
Payer: COMMERCIAL

## 2024-10-16 VITALS
SYSTOLIC BLOOD PRESSURE: 110 MMHG | OXYGEN SATURATION: 95 % | BODY MASS INDEX: 27.92 KG/M2 | TEMPERATURE: 98 F | WEIGHT: 195 LBS | DIASTOLIC BLOOD PRESSURE: 73 MMHG | HEIGHT: 70 IN | HEART RATE: 81 BPM

## 2024-10-16 DIAGNOSIS — R40.0 SOMNOLENCE: ICD-10-CM

## 2024-10-16 PROCEDURE — 99213 OFFICE O/P EST LOW 20 MIN: CPT

## 2024-10-31 ENCOUNTER — OUTPATIENT (OUTPATIENT)
Dept: OUTPATIENT SERVICES | Facility: HOSPITAL | Age: 59
LOS: 1 days | End: 2024-10-31

## 2024-10-31 ENCOUNTER — APPOINTMENT (OUTPATIENT)
Dept: SLEEP CENTER | Facility: HOME HEALTH | Age: 59
End: 2024-10-31
Payer: COMMERCIAL

## 2024-10-31 DIAGNOSIS — Z96.619 PRESENCE OF UNSPECIFIED ARTIFICIAL SHOULDER JOINT: Chronic | ICD-10-CM

## 2024-10-31 PROCEDURE — 95800 SLP STDY UNATTENDED: CPT | Mod: 26

## 2024-10-31 PROCEDURE — 95800 SLP STDY UNATTENDED: CPT

## 2024-11-01 DIAGNOSIS — G47.33 OBSTRUCTIVE SLEEP APNEA (ADULT) (PEDIATRIC): ICD-10-CM

## 2024-11-07 ENCOUNTER — NON-APPOINTMENT (OUTPATIENT)
Age: 59
End: 2024-11-07

## 2024-11-20 ENCOUNTER — APPOINTMENT (OUTPATIENT)
Dept: OTOLARYNGOLOGY | Facility: CLINIC | Age: 59
End: 2024-11-20
Payer: COMMERCIAL

## 2024-11-20 VITALS
HEIGHT: 70 IN | BODY MASS INDEX: 27.92 KG/M2 | WEIGHT: 195 LBS | OXYGEN SATURATION: 95 % | SYSTOLIC BLOOD PRESSURE: 137 MMHG | HEART RATE: 78 BPM | TEMPERATURE: 98 F | DIASTOLIC BLOOD PRESSURE: 80 MMHG

## 2024-11-20 DIAGNOSIS — G47.33 OBSTRUCTIVE SLEEP APNEA (ADULT) (PEDIATRIC): ICD-10-CM

## 2024-11-20 PROCEDURE — 99213 OFFICE O/P EST LOW 20 MIN: CPT

## 2024-12-31 ENCOUNTER — APPOINTMENT (OUTPATIENT)
Dept: UROLOGY | Facility: CLINIC | Age: 59
End: 2024-12-31
Payer: COMMERCIAL

## 2024-12-31 VITALS
WEIGHT: 195 LBS | HEART RATE: 99 BPM | BODY MASS INDEX: 27.92 KG/M2 | DIASTOLIC BLOOD PRESSURE: 72 MMHG | SYSTOLIC BLOOD PRESSURE: 110 MMHG | TEMPERATURE: 96.5 F | HEIGHT: 70 IN

## 2024-12-31 DIAGNOSIS — N40.0 BENIGN PROSTATIC HYPERPLASIA WITHOUT LOWER URINARY TRACT SYMPMS: ICD-10-CM

## 2024-12-31 PROCEDURE — 99214 OFFICE O/P EST MOD 30 MIN: CPT

## 2024-12-31 PROCEDURE — G2211 COMPLEX E/M VISIT ADD ON: CPT | Mod: NC

## 2025-01-02 LAB
APPEARANCE: CLEAR
BACTERIA UR CULT: NORMAL
BACTERIA: NEGATIVE /HPF
BILIRUBIN URINE: NEGATIVE
BLOOD URINE: NEGATIVE
CAST: 0 /LPF
COLOR: YELLOW
EPITHELIAL CELLS: 1 /HPF
GLUCOSE QUALITATIVE U: NEGATIVE MG/DL
KETONES URINE: NEGATIVE MG/DL
LEUKOCYTE ESTERASE URINE: NEGATIVE
MICROSCOPIC-UA: NORMAL
NITRITE URINE: NEGATIVE
PH URINE: 6
PROTEIN URINE: NEGATIVE MG/DL
RED BLOOD CELLS URINE: 1 /HPF
SPECIFIC GRAVITY URINE: 1.02
UROBILINOGEN URINE: 0.2 MG/DL
WHITE BLOOD CELLS URINE: 1 /HPF

## 2025-01-23 ENCOUNTER — APPOINTMENT (OUTPATIENT)
Dept: UROLOGY | Facility: CLINIC | Age: 60
End: 2025-01-23
Payer: COMMERCIAL

## 2025-01-23 ENCOUNTER — NON-APPOINTMENT (OUTPATIENT)
Age: 60
End: 2025-01-23

## 2025-01-23 VITALS
SYSTOLIC BLOOD PRESSURE: 121 MMHG | DIASTOLIC BLOOD PRESSURE: 78 MMHG | BODY MASS INDEX: 27.92 KG/M2 | WEIGHT: 195 LBS | HEIGHT: 70 IN | TEMPERATURE: 97.6 F | HEART RATE: 58 BPM

## 2025-01-23 DIAGNOSIS — N40.0 BENIGN PROSTATIC HYPERPLASIA WITHOUT LOWER URINARY TRACT SYMPMS: ICD-10-CM

## 2025-01-23 PROCEDURE — 76872 US TRANSRECTAL: CPT

## 2025-01-23 PROCEDURE — 99214 OFFICE O/P EST MOD 30 MIN: CPT | Mod: 25,57

## 2025-01-23 PROCEDURE — 52000 CYSTOURETHROSCOPY: CPT

## 2025-01-27 LAB
APPEARANCE: CLEAR
BACTERIA UR CULT: NORMAL
BACTERIA: NEGATIVE /HPF
BILIRUBIN URINE: NEGATIVE
BLOOD URINE: NEGATIVE
CAST: 0 /LPF
COLOR: YELLOW
EPITHELIAL CELLS: 0 /HPF
GLUCOSE QUALITATIVE U: NEGATIVE MG/DL
KETONES URINE: NEGATIVE MG/DL
LEUKOCYTE ESTERASE URINE: NEGATIVE
MICROSCOPIC-UA: NORMAL
NITRITE URINE: NEGATIVE
PH URINE: 6
PROTEIN URINE: NEGATIVE MG/DL
RED BLOOD CELLS URINE: 0 /HPF
SPECIFIC GRAVITY URINE: 1.01
UROBILINOGEN URINE: 0.2 MG/DL
WHITE BLOOD CELLS URINE: 0 /HPF

## 2025-02-28 ENCOUNTER — NON-APPOINTMENT (OUTPATIENT)
Age: 60
End: 2025-02-28

## 2025-02-28 NOTE — ASU PATIENT PROFILE, ADULT - NSICDXPASTMEDICALHX_GEN_ALL_CORE_FT
PAST MEDICAL HISTORY:  BPH (benign prostatic hyperplasia)     High cholesterol     HIV (human immunodeficiency virus infection)      PAST MEDICAL HISTORY:  BPH (benign prostatic hyperplasia)     H/O syncope w/ needles, last time @ [facility] for sinus surgery    High cholesterol     HIV (human immunodeficiency virus infection)

## 2025-02-28 NOTE — ASU PATIENT PROFILE, ADULT - NSICDXPASTSURGICALHX_GEN_ALL_CORE_FT
PAST SURGICAL HISTORY:  H/O hernia repair     H/O sinus surgery     Shoulder joint replacement status

## 2025-02-28 NOTE — ASU PATIENT PROFILE, ADULT - ABLE TO REACH PT
Left voicemail instruction with arrival time on [phone] instructed to arive at 12:30pm; No solid food/dairy/candy/gum after midnight Sunday; water allowed before 11:30am Monday; patient reminded to come with photo ID/insurance/credit card; dress in comfortable clothes; no jewelries/contact lens/valuable; no smoking/alcohol drinking/recreational drug use Sunday; escort to have photo ID; address and callback number was given ;/no

## 2025-03-03 ENCOUNTER — APPOINTMENT (OUTPATIENT)
Dept: UROLOGY | Facility: AMBULATORY SURGERY CENTER | Age: 60
End: 2025-03-03

## 2025-03-03 ENCOUNTER — TRANSCRIPTION ENCOUNTER (OUTPATIENT)
Age: 60
End: 2025-03-03

## 2025-03-03 ENCOUNTER — RESULT REVIEW (OUTPATIENT)
Age: 60
End: 2025-03-03

## 2025-03-03 ENCOUNTER — OUTPATIENT (OUTPATIENT)
Dept: OUTPATIENT SERVICES | Facility: HOSPITAL | Age: 60
LOS: 1 days | Discharge: HOME | End: 2025-03-03
Payer: COMMERCIAL

## 2025-03-03 VITALS
HEIGHT: 70 IN | WEIGHT: 195.33 LBS | HEART RATE: 62 BPM | TEMPERATURE: 98 F | DIASTOLIC BLOOD PRESSURE: 74 MMHG | OXYGEN SATURATION: 96 % | SYSTOLIC BLOOD PRESSURE: 116 MMHG | RESPIRATION RATE: 18 BRPM

## 2025-03-03 VITALS
RESPIRATION RATE: 13 BRPM | DIASTOLIC BLOOD PRESSURE: 61 MMHG | OXYGEN SATURATION: 94 % | SYSTOLIC BLOOD PRESSURE: 130 MMHG | HEART RATE: 74 BPM

## 2025-03-03 DIAGNOSIS — Z96.619 PRESENCE OF UNSPECIFIED ARTIFICIAL SHOULDER JOINT: Chronic | ICD-10-CM

## 2025-03-03 DIAGNOSIS — Z98.890 OTHER SPECIFIED POSTPROCEDURAL STATES: Chronic | ICD-10-CM

## 2025-03-03 PROCEDURE — 52601 PROSTATECTOMY (TURP): CPT

## 2025-03-03 PROCEDURE — 88305 TISSUE EXAM BY PATHOLOGIST: CPT | Mod: 26

## 2025-03-03 RX ORDER — PHENAZOPYRIDINE HCL 100 MG
2 TABLET ORAL
Qty: 12 | Refills: 0
Start: 2025-03-03 | End: 2025-03-04

## 2025-03-03 RX ORDER — OXYBUTYNIN CHLORIDE 5 MG/1
1 TABLET, FILM COATED, EXTENDED RELEASE ORAL
Qty: 5 | Refills: 0
Start: 2025-03-03 | End: 2025-03-07

## 2025-03-03 RX ORDER — DOCUSATE SODIUM 100 MG
1 CAPSULE ORAL
Qty: 28 | Refills: 0
Start: 2025-03-03 | End: 2025-03-16

## 2025-03-03 RX ORDER — FENTANYL CITRATE-0.9 % NACL/PF 100MCG/2ML
25 SYRINGE (ML) INTRAVENOUS
Refills: 0 | Status: DISCONTINUED | OUTPATIENT
Start: 2025-03-03 | End: 2025-03-03

## 2025-03-03 RX ORDER — TRAMADOL HYDROCHLORIDE AND ACETAMINOPHEN 37.5; 325 MG/1; MG/1
1 TABLET ORAL
Qty: 9 | Refills: 0
Start: 2025-03-03 | End: 2025-03-05

## 2025-03-03 RX ORDER — ACETAMINOPHEN 500 MG/5ML
1000 LIQUID (ML) ORAL ONCE
Refills: 0 | Status: COMPLETED | OUTPATIENT
Start: 2025-03-03 | End: 2025-03-03

## 2025-03-03 RX ORDER — TRAZODONE HCL 100 MG
0 TABLET ORAL
Refills: 0 | DISCHARGE

## 2025-03-03 RX ORDER — SODIUM CHLORIDE 9 G/1000ML
500 INJECTION, SOLUTION INTRAVENOUS
Refills: 0 | Status: DISCONTINUED | OUTPATIENT
Start: 2025-03-03 | End: 2025-03-03

## 2025-03-03 RX ORDER — CLONAZEPAM 0.5 MG/1
1 TABLET ORAL
Refills: 0 | DISCHARGE

## 2025-03-03 RX ORDER — ESCITALOPRAM OXALATE 20 MG/1
1 TABLET ORAL
Refills: 0 | DISCHARGE

## 2025-03-03 RX ORDER — ASPIRIN 325 MG
1 TABLET ORAL
Refills: 0 | DISCHARGE

## 2025-03-03 RX ORDER — MIRABEGRON 50 MG/1
1 TABLET, FILM COATED, EXTENDED RELEASE ORAL
Refills: 0 | DISCHARGE

## 2025-03-03 RX ORDER — ROSUVASTATIN CALCIUM 20 MG/1
1 TABLET, FILM COATED ORAL
Refills: 0 | DISCHARGE

## 2025-03-03 RX ADMIN — Medication 400 MILLIGRAM(S): at 17:00

## 2025-03-03 RX ADMIN — Medication 1000 MILLIGRAM(S): at 17:30

## 2025-03-03 NOTE — ASU PREOP CHECKLIST - ISOLATION PRECAUTIONS
Patient requesting 90 day supply.  
Paxil 20mg QD  LR 1-25-18  30 WITH 11 REFILLS  LV 12-4-17   Requesting 90 day supply  
none

## 2025-03-03 NOTE — ASU DISCHARGE PLAN (ADULT/PEDIATRIC) - ASU DC SPECIAL INSTRUCTIONSFT
TRANSURETHRAL RESECTION OF PROSTATE    GENERAL: It is common to have blood in your urine after your procedure.  It may be pink or even red; and it is important to increase fluid intake to 2-3L of water per day to keep the urine as clear as possible. Please inform your doctor if you have a significant amount of clot in the urine or if you are unable to void at all.  The urine may clear and then become bloody again especially as you are more physically active. It is not uncommon to have some burning when you urinate, this will gradually improve. With a catheter in place, it is not uncommon to have occasional leakage or urine or blood around the catheter. Please call your urologist if this is excessive and/or the urine is not draining through the catheter into the bag.    CATHETER: Most patients are sent home with a Terrazas catheter, which continuously drains the urine from the bladder. If you still have a catheter, the nurses will review instructions and care before you go home. For men, you may have a prescription for lidocaine jelly to apply to the tip of your penis, as needed, for catheter related discomfort.    UROLOGIC MEDICATIONS:  The following medications may have been sent to your pharmacy for catheter related discomfort: Flomax (tamsulosin) 0.4mg at bedtime until catheter removed, Ditropan (oxybutynin) 5mg every 8 hours as needed for bladder spasms, and Pyridium (phenazopyridine) 100mg every 8 hours as needed for dysuria or bladder discomfort for max 3 days (Pyridium will make your urine orange). Please confirm with your nurse as to which medications and which pharmacy were sent prior to discharge.    PAIN: You may take Tylenol (acetaminophen) 650-975mg and/or Motrin (ibuprofen) 400-600mg, both available over the counter, for pain every 6 hours as needed. Do not exceed 4000mg of Tylenol (acetaminophen) daily. You may alternate these medications such that you take one or the other every 3 hours for around the clock pain coverage.    ANTIBIOTICS: You may be given a prescription for an antibiotic, please take this medication as instructed and be sure to complete the entire course.     STOOL SOFTENERS: Do not allow yourself to become constipated as straining may cause bleeding. Take stool softeners or a laxative (ex. Miralax, Colace, Senokot, ExLax, etc), available over the counter, if needed.    ANTICOAGULATION: If you are taking any blood thinning medications, please discuss with your urologist prior to restarting these medications unless otherwise specified.    BATHING: You may shower or bathe. If going home with terrazas, shower only until catheter is removed.    DIET: You may resume your regular diet and regular medication regimen.    ACTIVITY: No heavy lifting or strenuous exercise until you are evaluated at your post-operative appointment. Otherwise, you may return to your usual level of physical activity.    FOLLOW-UP: If you did not already schedule your post-operative appointment, please call your urologist to schedule and follow-up appointment.    CALL YOUR UROLOGIST IF: You have any bleeding that does not stop, inability to void >8 hours, fever over 100.4 F, chills, persistent nausea/vomiting, changes in your incision concerning for infection, or if your pain is not controlled on your discharge pain medications.

## 2025-03-03 NOTE — ASU PREOP CHECKLIST - HEART RATE (BEATS/MIN)
Pt was just seen by you and was prescribed ADDERALL XR, 30mg  Pt wants to know if the script can be sent over for the generic  Please advise  62

## 2025-03-03 NOTE — ASU DISCHARGE PLAN (ADULT/PEDIATRIC) - CARE PROVIDER_API CALL
Keshawn Arroyo.  Urology  [address]  [place] [address]  Phone: [phone]  Fax: [phone]  Follow Up Time: 1-3 days

## 2025-03-03 NOTE — ASU DISCHARGE PLAN (ADULT/PEDIATRIC) - FINANCIAL ASSISTANCE
[facility] provides services at a reduced cost to those who are determined to be eligible through [facility]’s financial assistance program. Information regarding [facility]’s financial assistance program can be found by going to [email or web address] or by calling [phone].

## 2025-03-04 ENCOUNTER — APPOINTMENT (OUTPATIENT)
Dept: UROLOGY | Facility: CLINIC | Age: 60
End: 2025-03-04

## 2025-03-04 ENCOUNTER — APPOINTMENT (OUTPATIENT)
Dept: UROLOGY | Facility: CLINIC | Age: 60
End: 2025-03-04
Payer: COMMERCIAL

## 2025-03-04 ENCOUNTER — NON-APPOINTMENT (OUTPATIENT)
Age: 60
End: 2025-03-04

## 2025-03-04 PROCEDURE — 99024 POSTOP FOLLOW-UP VISIT: CPT

## 2025-03-05 ENCOUNTER — NON-APPOINTMENT (OUTPATIENT)
Age: 60
End: 2025-03-05

## 2025-03-05 ENCOUNTER — APPOINTMENT (OUTPATIENT)
Dept: UROLOGY | Facility: CLINIC | Age: 60
End: 2025-03-05
Payer: COMMERCIAL

## 2025-03-05 VITALS
WEIGHT: 195 LBS | BODY MASS INDEX: 27.92 KG/M2 | SYSTOLIC BLOOD PRESSURE: 135 MMHG | DIASTOLIC BLOOD PRESSURE: 84 MMHG | HEIGHT: 70 IN | HEART RATE: 99 BPM | TEMPERATURE: 97.3 F

## 2025-03-05 PROCEDURE — 51700 IRRIGATION OF BLADDER: CPT | Mod: 58

## 2025-03-05 PROCEDURE — A4216: CPT | Mod: NC

## 2025-03-05 NOTE — ED PROVIDER NOTE - CROS ED GI ALL NEG
LEFT PATIENT DETAILED VM LETTING HIM KNOW THAT HIS APPT ON 3/11/25 HAS BEEN MOVED TO [facility] ON THE SAME DAY AND TIME AS IT WAS ORIGINALLY SCHEDULED. PATIENT HAS SEEN PA BEFORE, OK TO SEE AGAIN (PER [facility]). ALSO REMINDED PATIENT TO GET LABS A FEW DAYS PRIOR TO APPT. PLEASE WARM TRANSFER IF PATIENT CALLS BACK AND HAS ANY QUESTIONS.  
- - -

## 2025-03-20 PROBLEM — Z87.898 PERSONAL HISTORY OF OTHER SPECIFIED CONDITIONS: Chronic | Status: ACTIVE | Noted: 2025-03-03

## 2025-04-02 ENCOUNTER — APPOINTMENT (OUTPATIENT)
Dept: UROLOGY | Facility: CLINIC | Age: 60
End: 2025-04-02
Payer: COMMERCIAL

## 2025-04-02 VITALS
WEIGHT: 195 LBS | HEART RATE: 74 BPM | DIASTOLIC BLOOD PRESSURE: 82 MMHG | BODY MASS INDEX: 27.92 KG/M2 | HEIGHT: 70 IN | SYSTOLIC BLOOD PRESSURE: 138 MMHG | TEMPERATURE: 97.8 F

## 2025-04-02 PROCEDURE — 51798 US URINE CAPACITY MEASURE: CPT

## 2025-04-02 PROCEDURE — 99024 POSTOP FOLLOW-UP VISIT: CPT

## 2025-04-09 ENCOUNTER — NON-APPOINTMENT (OUTPATIENT)
Age: 60
End: 2025-04-09

## 2025-04-09 DIAGNOSIS — N40.0 BENIGN PROSTATIC HYPERPLASIA WITHOUT LOWER URINARY TRACT SYMPMS: ICD-10-CM

## 2025-04-09 LAB
APPEARANCE: ABNORMAL
BACTERIA UR CULT: NORMAL
BACTERIA: ABNORMAL /HPF
BILIRUBIN URINE: NEGATIVE
BLOOD URINE: ABNORMAL
CAST: 2 /LPF
COLOR: NORMAL
EPITHELIAL CELLS: 0 /HPF
GLUCOSE QUALITATIVE U: NEGATIVE MG/DL
KETONES URINE: ABNORMAL MG/DL
LEUKOCYTE ESTERASE URINE: ABNORMAL
MICROSCOPIC-UA: NORMAL
NITRITE URINE: NEGATIVE
PH URINE: 5.5
PROTEIN URINE: 100 MG/DL
RED BLOOD CELLS URINE: 92 /HPF
SPECIFIC GRAVITY URINE: 1.03
UROBILINOGEN URINE: 1 MG/DL
WHITE BLOOD CELLS URINE: 222 /HPF

## 2025-04-16 ENCOUNTER — TRANSCRIPTION ENCOUNTER (OUTPATIENT)
Age: 60
End: 2025-04-16

## 2025-04-16 LAB — BACTERIA UR CULT: ABNORMAL

## 2025-07-02 ENCOUNTER — APPOINTMENT (OUTPATIENT)
Dept: UROLOGY | Facility: CLINIC | Age: 60
End: 2025-07-02
Payer: COMMERCIAL

## 2025-07-02 VITALS
WEIGHT: 195 LBS | HEIGHT: 70 IN | SYSTOLIC BLOOD PRESSURE: 134 MMHG | TEMPERATURE: 98.1 F | DIASTOLIC BLOOD PRESSURE: 84 MMHG | BODY MASS INDEX: 27.92 KG/M2 | HEART RATE: 74 BPM

## 2025-07-02 PROCEDURE — 99214 OFFICE O/P EST MOD 30 MIN: CPT

## 2025-07-07 LAB
APPEARANCE: CLEAR
BACTERIA UR CULT: NORMAL
BACTERIA: NEGATIVE /HPF
BILIRUBIN URINE: NEGATIVE
BLOOD URINE: NEGATIVE
CAST: 0 /LPF
COLOR: YELLOW
EPITHELIAL CELLS: 0 /HPF
GLUCOSE QUALITATIVE U: NEGATIVE MG/DL
KETONES URINE: ABNORMAL MG/DL
LEUKOCYTE ESTERASE URINE: NEGATIVE
MICROSCOPIC-UA: NORMAL
NITRITE URINE: NEGATIVE
PH URINE: 5.5
PROTEIN URINE: NEGATIVE MG/DL
PSA FREE FLD-MCNC: 18 %
PSA FREE SERPL-MCNC: 0.11 NG/ML
PSA SERPL-MCNC: 0.62 NG/ML
RED BLOOD CELLS URINE: 0 /HPF
SPECIFIC GRAVITY URINE: 1.03
UROBILINOGEN URINE: 0.2 MG/DL
WHITE BLOOD CELLS URINE: 0 /HPF

## (undated) DEVICE — PACK RHINOPLASTY

## (undated) DEVICE — COTTONBALL LG

## (undated) DEVICE — TUBING RANGER FLUID IRRIGATION SET DISP

## (undated) DEVICE — DRAPE TOWEL BLUE 17" X 24"

## (undated) DEVICE — SLV COMPRESSION KNEE MED

## (undated) DEVICE — VENODYNE/SCD SLEEVE CALF MEDIUM

## (undated) DEVICE — GOWN ROYAL SILK XL

## (undated) DEVICE — WARMING BLANKET LOWER ADULT

## (undated) DEVICE — TUBING RAPIDVAC SMOKE EVACUATOR .25" X 10FT

## (undated) DEVICE — WARMING BLANKET UPPER ADULT

## (undated) DEVICE — ELCTR PLASMA LOOP MEDIUM ANGLED 24FR 12-30 DEG

## (undated) DEVICE — DRSG TELFA 3 X 8

## (undated) DEVICE — ELCTR BOVIE SUCTION 8FR 6"

## (undated) DEVICE — GLV 7.5 PROTEXIS (WHITE)

## (undated) DEVICE — SUT CHROMIC 2-0 27" SH

## (undated) DEVICE — POSITIONER FOAM EGG CRATE ULNAR 2PCS (PINK)

## (undated) DEVICE — GLV 6.5 PROTEXIS W HYDROGEL

## (undated) DEVICE — SYR LUER LOK 5CC

## (undated) DEVICE — TUBING SUCTION NONCONDUCTIVE 6MM X 12FT

## (undated) DEVICE — DRAINAGE BAG URINARY 4L

## (undated) DEVICE — SOL IRR BAG NS 0.9% 3000ML

## (undated) DEVICE — FOLEY CATH 3-WAY 22FR 30CC LATEX HEMATURIA

## (undated) DEVICE — UROVAC

## (undated) DEVICE — FOLEY CATH 3-WAY 20FR 30CC LATEX HEMATURIA

## (undated) DEVICE — SOL ANTI FOG

## (undated) DEVICE — PETRI DISH MED 3.5"

## (undated) DEVICE — APPLICATOR FOR FLOSEAL

## (undated) DEVICE — PACK CYSTO

## (undated) DEVICE — ELCTR PLASMA BUTTON OVAL 24FR 12-30 DEG

## (undated) DEVICE — NDL HYPO SAFE 18G X 1.5" (PINK)

## (undated) DEVICE — PREP BETADINE KIT

## (undated) DEVICE — SUT CHROMIC 4-0 18" PS-2